# Patient Record
Sex: FEMALE | Race: WHITE | Employment: FULL TIME | URBAN - METROPOLITAN AREA
[De-identification: names, ages, dates, MRNs, and addresses within clinical notes are randomized per-mention and may not be internally consistent; named-entity substitution may affect disease eponyms.]

---

## 2017-05-16 ENCOUNTER — EMPLOYEE WELLNESS (OUTPATIENT)
Dept: OTHER | Age: 52
End: 2017-05-16

## 2017-05-16 LAB
CHOLESTEROL, TOTAL: 200 MG/DL (ref 0–199)
GLUCOSE BLD-MCNC: 82 MG/DL (ref 70–99)
HDLC SERPL-MCNC: 57 MG/DL (ref 40–60)
LDL CHOLESTEROL CALCULATED: 105 MG/DL
TRIGL SERPL-MCNC: 188 MG/DL (ref 0–150)

## 2018-02-20 ENCOUNTER — HOSPITAL ENCOUNTER (OUTPATIENT)
Dept: MAMMOGRAPHY | Age: 53
Discharge: HOME | End: 2018-02-20
Attending: FAMILY MEDICINE | Admitting: FAMILY MEDICINE

## 2018-02-20 DIAGNOSIS — Z12.31 VISIT FOR SCREENING MAMMOGRAM: ICD-10-CM

## 2018-03-20 VITALS — WEIGHT: 213 LBS | BODY MASS INDEX: 34.12 KG/M2

## 2018-12-11 ENCOUNTER — OFFICE VISIT (OUTPATIENT)
Dept: FAMILY MEDICINE CLINIC | Age: 53
End: 2018-12-11
Payer: COMMERCIAL

## 2018-12-11 VITALS
OXYGEN SATURATION: 97 % | RESPIRATION RATE: 16 BRPM | DIASTOLIC BLOOD PRESSURE: 88 MMHG | BODY MASS INDEX: 33.73 KG/M2 | WEIGHT: 214.9 LBS | SYSTOLIC BLOOD PRESSURE: 118 MMHG | TEMPERATURE: 98.4 F | HEART RATE: 76 BPM | HEIGHT: 67 IN

## 2018-12-11 DIAGNOSIS — Z12.11 SCREEN FOR COLON CANCER: ICD-10-CM

## 2018-12-11 DIAGNOSIS — Z00.00 WELL ADULT EXAM: Primary | ICD-10-CM

## 2018-12-11 DIAGNOSIS — Z23 NEED FOR TETANUS BOOSTER: ICD-10-CM

## 2018-12-11 DIAGNOSIS — I10 BENIGN ESSENTIAL HTN: ICD-10-CM

## 2018-12-11 LAB
A/G RATIO: 1.6 (ref 1.1–2.2)
ALBUMIN SERPL-MCNC: 4.8 G/DL (ref 3.4–5)
ALP BLD-CCNC: 98 U/L (ref 40–129)
ALT SERPL-CCNC: 21 U/L (ref 10–40)
ANION GAP SERPL CALCULATED.3IONS-SCNC: 16 MMOL/L (ref 3–16)
AST SERPL-CCNC: 22 U/L (ref 15–37)
BASOPHILS ABSOLUTE: 0 K/UL (ref 0–0.2)
BASOPHILS RELATIVE PERCENT: 1.1 %
BILIRUB SERPL-MCNC: 0.5 MG/DL (ref 0–1)
BUN BLDV-MCNC: 6 MG/DL (ref 7–20)
CALCIUM SERPL-MCNC: 9.5 MG/DL (ref 8.3–10.6)
CHLORIDE BLD-SCNC: 101 MMOL/L (ref 99–110)
CHOLESTEROL, TOTAL: 253 MG/DL (ref 0–199)
CO2: 23 MMOL/L (ref 21–32)
CREAT SERPL-MCNC: 0.7 MG/DL (ref 0.6–1.1)
CREATININE URINE: 40.5 MG/DL (ref 28–259)
EOSINOPHILS ABSOLUTE: 0.2 K/UL (ref 0–0.6)
EOSINOPHILS RELATIVE PERCENT: 3.9 %
GFR AFRICAN AMERICAN: >60
GFR NON-AFRICAN AMERICAN: >60
GLOBULIN: 3 G/DL
GLUCOSE BLD-MCNC: 119 MG/DL (ref 70–99)
HCT VFR BLD CALC: 38.1 % (ref 36–48)
HDLC SERPL-MCNC: 68 MG/DL (ref 40–60)
HEMOGLOBIN: 12.7 G/DL (ref 12–16)
HEPATITIS C ANTIBODY INTERPRETATION: NORMAL
LDL CHOLESTEROL CALCULATED: 157 MG/DL
LYMPHOCYTES ABSOLUTE: 1.1 K/UL (ref 1–5.1)
LYMPHOCYTES RELATIVE PERCENT: 24.9 %
MCH RBC QN AUTO: 31 PG (ref 26–34)
MCHC RBC AUTO-ENTMCNC: 33.4 G/DL (ref 31–36)
MCV RBC AUTO: 92.8 FL (ref 80–100)
MICROALBUMIN UR-MCNC: <1.2 MG/DL
MICROALBUMIN/CREAT UR-RTO: NORMAL MG/G (ref 0–30)
MONOCYTES ABSOLUTE: 0.4 K/UL (ref 0–1.3)
MONOCYTES RELATIVE PERCENT: 9.2 %
NEUTROPHILS ABSOLUTE: 2.7 K/UL (ref 1.7–7.7)
NEUTROPHILS RELATIVE PERCENT: 60.9 %
PDW BLD-RTO: 12.7 % (ref 12.4–15.4)
PLATELET # BLD: 254 K/UL (ref 135–450)
PMV BLD AUTO: 7.8 FL (ref 5–10.5)
POTASSIUM SERPL-SCNC: 4.3 MMOL/L (ref 3.5–5.1)
RBC # BLD: 4.1 M/UL (ref 4–5.2)
SODIUM BLD-SCNC: 140 MMOL/L (ref 136–145)
TOTAL PROTEIN: 7.8 G/DL (ref 6.4–8.2)
TRIGL SERPL-MCNC: 140 MG/DL (ref 0–150)
TSH SERPL DL<=0.05 MIU/L-ACNC: 2.08 UIU/ML (ref 0.27–4.2)
VLDLC SERPL CALC-MCNC: 28 MG/DL
WBC # BLD: 4.5 K/UL (ref 4–11)

## 2018-12-11 PROCEDURE — 90471 IMMUNIZATION ADMIN: CPT | Performed by: FAMILY MEDICINE

## 2018-12-11 PROCEDURE — 99386 PREV VISIT NEW AGE 40-64: CPT | Performed by: FAMILY MEDICINE

## 2018-12-11 PROCEDURE — 90715 TDAP VACCINE 7 YRS/> IM: CPT | Performed by: FAMILY MEDICINE

## 2018-12-11 RX ORDER — LISINOPRIL 20 MG/1
20 TABLET ORAL DAILY
Qty: 90 TABLET | Refills: 1 | Status: SHIPPED | OUTPATIENT
Start: 2018-12-11 | End: 2018-12-27 | Stop reason: SDUPTHER

## 2018-12-11 RX ORDER — METOPROLOL SUCCINATE 50 MG/1
50 TABLET, EXTENDED RELEASE ORAL DAILY
Qty: 90 TABLET | Refills: 1 | Status: SHIPPED | OUTPATIENT
Start: 2018-12-11 | End: 2018-12-27 | Stop reason: SDUPTHER

## 2018-12-11 ASSESSMENT — ENCOUNTER SYMPTOMS
EYE ITCHING: 0
WHEEZING: 0
RHINORRHEA: 0
VOMITING: 0
ABDOMINAL PAIN: 0
SHORTNESS OF BREATH: 0
CHEST TIGHTNESS: 0
TROUBLE SWALLOWING: 0
EYE REDNESS: 0
NAUSEA: 0

## 2018-12-11 ASSESSMENT — PATIENT HEALTH QUESTIONNAIRE - PHQ9
SUM OF ALL RESPONSES TO PHQ9 QUESTIONS 1 & 2: 0
2. FEELING DOWN, DEPRESSED OR HOPELESS: 0
SUM OF ALL RESPONSES TO PHQ QUESTIONS 1-9: 0
1. LITTLE INTEREST OR PLEASURE IN DOING THINGS: 0
SUM OF ALL RESPONSES TO PHQ QUESTIONS 1-9: 0

## 2018-12-11 NOTE — PROGRESS NOTES
Subjective:      Patient ID: Danielle Bonds is a 48 y.o. female. Here to become established       Well Adult Physical   Patient here for a comprehensive physical exam.The patient reports problems -   Hypertension    bp at home  Well controlled    , denies  ha, visual changes, syncope, presyncope, cp, sob, palpitations, edema, schulte, orthopnea, pnd,  Compliant with medication, no secondary effects       Do you take any herbs or supplements that were not prescribed by a doctor? no Are you taking calcium supplements? no Are you taking aspirin daily? no   History:  Last pap 5 years ago, \"normal\"  Mammogram utd  Colonoscopy due  Tdap due   Flu vaccine utd  Eye exam: 2 years ago   Dentist: every 6 months         Review of Systems   Constitutional: Negative for activity change, appetite change, fatigue, fever and unexpected weight change. HENT: Negative for congestion, postnasal drip, rhinorrhea and trouble swallowing. Eyes: Negative for redness and itching. Respiratory: Negative for chest tightness, shortness of breath and wheezing. Cardiovascular: Negative for chest pain and palpitations. Gastrointestinal: Negative for abdominal pain, nausea and vomiting. Endocrine: Negative for cold intolerance, heat intolerance, polydipsia and polyphagia. Genitourinary: Negative for difficulty urinating, dyspareunia and urgency. Musculoskeletal: Negative for arthralgias, joint swelling, myalgias and neck pain. Skin: Negative for rash. Neurological: Negative for light-headedness and headaches. Hematological: Negative for adenopathy. Psychiatric/Behavioral: The patient is not nervous/anxious. is allergic to hydrochlorothiazide.       Current Outpatient Prescriptions:     lisinopril (PRINIVIL;ZESTRIL) 20 MG tablet, Take 1 tablet by mouth daily, Disp: 90 tablet, Rfl: 1    metoprolol succinate (TOPROL XL) 50 MG extended release tablet, Take 1 tablet by mouth daily, Disp: 90 tablet, Rfl: 1    metoprolol (LOPRESSOR) 25 MG tablet, Take 50 mg by mouth daily. , Disp: , Rfl:     omeprazole (PRILOSEC) 20 MG capsule, Take 20 mg by mouth daily. , Disp: , Rfl:      has a past medical history of GERD (gastroesophageal reflux disease); Hypertension; Leg swelling; Obesity (BMI 30-39.9); Varicose veins of left lower extremity with inflammation; and Varicose veins of leg with pain. Past Surgical History:   Procedure Laterality Date    EYE SURGERY      lasix    EYE SURGERY      [facility]   [address]    open        reports that she quit smoking about 18 years ago. Her smoking use included Cigarettes. She has a 20.00 pack-year smoking history. She has never used smokeless tobacco. She reports that she drinks about 2.4 oz of alcohol per week . She reports that she does not use drugs. family history includes Cancer in her maternal grandfather; Heart Failure in her father; High Blood Pressure in her father; No Known Problems in her mother. Objective:  Blood pressure 118/88, pulse 76, temperature 98.4 °F (36.9 °C), temperature source Tympanic, resp. rate 16, height 5' 7\" (1.702 m), weight 214 lb 14.4 oz (97.5 kg), SpO2 97 %, not currently breastfeeding. Physical Exam   Constitutional: She is oriented to person, place, and time. She appears well-developed and well-nourished. No distress. HENT:   Head: Normocephalic and atraumatic. Right Ear: External ear normal.   Left Ear: External ear normal.   Nose: Nose normal.   Mouth/Throat: Oropharynx is clear and moist. No oropharyngeal exudate. Eyes: Pupils are equal, round, and reactive to light. Conjunctivae and EOM are normal. Right eye exhibits no discharge. Left eye exhibits no discharge. No scleral icterus. Neck: Normal range of motion. Neck supple. No JVD present. No tracheal deviation present. No thyromegaly present. No carotid bruits   Cardiovascular: Normal rate, regular rhythm, normal heart sounds and intact distal pulses.   Exam reveals no gallop and no friction rub. No murmur heard. Pulses:       Carotid pulses are 2+ on the right side, and 2+ on the left side. No edema     Pulmonary/Chest: Effort normal and breath sounds normal. No respiratory distress. She has no wheezes. She has no rales. She exhibits no tenderness. Abdominal: Soft. Bowel sounds are normal. She exhibits no distension and no mass. There is no tenderness. There is no rebound and no guarding. Musculoskeletal: Normal range of motion. She exhibits no edema or tenderness. Lymphadenopathy:     She has no cervical adenopathy. Neurological: She is alert and oriented to person, place, and time. She has normal reflexes. No cranial nerve deficit. She exhibits normal muscle tone. Coordination normal.   Skin: Skin is warm. Capillary refill takes less than 2 seconds. No rash noted. She is not diaphoretic. No erythema. No pallor. Psychiatric: She has a normal mood and affect. Her behavior is normal. Judgment and thought content normal.   Nursing note and vitals reviewed. Assessment:       Diagnosis Orders   1. Well adult exam  Comprehensive Metabolic Panel    Lipid Panel    HEMOGLOBIN A1C    TSH without Reflex    Microalbumin / Creatinine Urine Ratio    CBC Auto Differential    Hepatitis C Antibody    HIV Screen   2. Screen for colon cancer  Ambulatory referral to Gastroenterology   3. Benign essential HTN  Microalbumin / Creatinine Urine Ratio    lisinopril (PRINIVIL;ZESTRIL) 20 MG tablet    metoprolol succinate (TOPROL XL) 50 MG extended release tablet           Plan:      Well adult:    . Doing well, encourage healthy diet, exercise, safety    . Screening labs orders given   . Preventive: tdap   . Colonoscopy referred    . Mammogram: utd   . Tobacco abuse former smoker    3.  At goal  Refills  Continue same medications no changes needed at this time   Encourage compliance with medication, life style changes    Fu 6 months and anytime for pap  patient agrees and verbalizes understanding      Abdi Holguin received counseling on the following healthy behaviors: nutrition, exercise and medication adherence    Patient given educational materials on Nutrition, Exercise and Hypertension    I have instructed Abdi Holguin to complete a self tracking handout on Blood Pressures  and Weights and instructed them to bring it with them to her next appointment. Discussed use, benefit, and side effects of prescribed medications. Barriers to medication compliance addressed. All patient questions answered. Pt voiced understanding.                  Mayra Meza MD

## 2018-12-12 LAB
ESTIMATED AVERAGE GLUCOSE: 119.8 MG/DL
HBA1C MFR BLD: 5.8 %
HIV AG/AB: NORMAL
HIV ANTIGEN: NORMAL
HIV-1 ANTIBODY: NORMAL
HIV-2 AB: NORMAL

## 2018-12-27 ENCOUNTER — PATIENT MESSAGE (OUTPATIENT)
Dept: FAMILY MEDICINE CLINIC | Age: 53
End: 2018-12-27

## 2018-12-27 DIAGNOSIS — I10 BENIGN ESSENTIAL HTN: ICD-10-CM

## 2018-12-28 RX ORDER — LISINOPRIL 20 MG/1
20 TABLET ORAL DAILY
Qty: 90 TABLET | Refills: 1 | Status: SHIPPED | OUTPATIENT
Start: 2018-12-28 | End: 2019-05-15 | Stop reason: SDUPTHER

## 2018-12-28 RX ORDER — METOPROLOL SUCCINATE 50 MG/1
50 TABLET, EXTENDED RELEASE ORAL DAILY
Qty: 90 TABLET | Refills: 1 | Status: SHIPPED | OUTPATIENT
Start: 2018-12-28 | End: 2019-01-02 | Stop reason: SDUPTHER

## 2019-01-02 ENCOUNTER — PATIENT MESSAGE (OUTPATIENT)
Dept: FAMILY MEDICINE CLINIC | Age: 54
End: 2019-01-02

## 2019-01-02 DIAGNOSIS — I10 BENIGN ESSENTIAL HTN: ICD-10-CM

## 2019-01-02 RX ORDER — METOPROLOL SUCCINATE 50 MG/1
100 TABLET, EXTENDED RELEASE ORAL DAILY
Qty: 180 TABLET | Refills: 1 | Status: SHIPPED | OUTPATIENT
Start: 2019-01-02 | End: 2019-01-16 | Stop reason: SDUPTHER

## 2019-01-02 RX ORDER — AMLODIPINE BESYLATE 5 MG/1
5 TABLET ORAL DAILY
Qty: 90 TABLET | Refills: 3 | Status: SHIPPED | OUTPATIENT
Start: 2019-01-02 | End: 2019-05-15 | Stop reason: SDUPTHER

## 2019-01-16 RX ORDER — METOPROLOL SUCCINATE 50 MG/1
100 TABLET, EXTENDED RELEASE ORAL DAILY
Qty: 180 TABLET | Refills: 0 | Status: SHIPPED | OUTPATIENT
Start: 2019-01-16 | End: 2019-05-15 | Stop reason: SDUPTHER

## 2019-03-05 ENCOUNTER — TELEPHONE (OUTPATIENT)
Dept: FAMILY MEDICINE CLINIC | Age: 54
End: 2019-03-05

## 2019-05-15 DIAGNOSIS — I10 BENIGN ESSENTIAL HTN: ICD-10-CM

## 2019-05-15 RX ORDER — METOPROLOL SUCCINATE 50 MG/1
100 TABLET, EXTENDED RELEASE ORAL DAILY
Qty: 180 TABLET | Refills: 0 | Status: SHIPPED | OUTPATIENT
Start: 2019-05-15 | End: 2019-08-08 | Stop reason: SDUPTHER

## 2019-05-15 RX ORDER — LISINOPRIL 20 MG/1
20 TABLET ORAL DAILY
Qty: 90 TABLET | Refills: 1 | Status: SHIPPED | OUTPATIENT
Start: 2019-05-15 | End: 2019-10-25 | Stop reason: SDUPTHER

## 2019-05-15 RX ORDER — AMLODIPINE BESYLATE 5 MG/1
5 TABLET ORAL DAILY
Qty: 90 TABLET | Refills: 3 | Status: SHIPPED | OUTPATIENT
Start: 2019-05-15 | End: 2019-10-25 | Stop reason: ALTCHOICE

## 2019-06-17 ENCOUNTER — HOSPITAL ENCOUNTER (OUTPATIENT)
Dept: MAMMOGRAPHY | Age: 54
Discharge: HOME | End: 2019-06-22
Payer: COMMERCIAL

## 2019-06-17 DIAGNOSIS — Z12.31 VISIT FOR SCREENING MAMMOGRAM: ICD-10-CM

## 2019-06-17 PROCEDURE — 77063 BREAST TOMOSYNTHESIS BI: CPT

## 2019-09-06 ENCOUNTER — TELEPHONE (OUTPATIENT)
Dept: FAMILY MEDICINE CLINIC | Age: 54
End: 2019-09-06

## 2019-09-06 ENCOUNTER — PATIENT MESSAGE (OUTPATIENT)
Dept: FAMILY MEDICINE CLINIC | Age: 54
End: 2019-09-06

## 2019-09-06 DIAGNOSIS — I10 BENIGN ESSENTIAL HTN: ICD-10-CM

## 2019-09-06 RX ORDER — METOPROLOL SUCCINATE 50 MG/1
TABLET, EXTENDED RELEASE ORAL
Qty: 30 TABLET | Refills: 0 | Status: SHIPPED | OUTPATIENT
Start: 2019-09-06 | End: 2019-09-11 | Stop reason: SDUPTHER

## 2019-10-25 ENCOUNTER — OFFICE VISIT (OUTPATIENT)
Dept: FAMILY MEDICINE CLINIC | Age: 54
End: 2019-10-25
Payer: COMMERCIAL

## 2019-10-25 VITALS
OXYGEN SATURATION: 97 % | DIASTOLIC BLOOD PRESSURE: 104 MMHG | WEIGHT: 213 LBS | HEIGHT: 67 IN | SYSTOLIC BLOOD PRESSURE: 158 MMHG | HEART RATE: 79 BPM | TEMPERATURE: 97.7 F | BODY MASS INDEX: 33.43 KG/M2 | RESPIRATION RATE: 16 BRPM

## 2019-10-25 DIAGNOSIS — Z01.419 WELL FEMALE EXAM WITH ROUTINE GYNECOLOGICAL EXAM: Primary | ICD-10-CM

## 2019-10-25 DIAGNOSIS — N92.6 ABNORMAL MENSES: ICD-10-CM

## 2019-10-25 DIAGNOSIS — I10 BENIGN ESSENTIAL HTN: ICD-10-CM

## 2019-10-25 PROCEDURE — 36415 COLL VENOUS BLD VENIPUNCTURE: CPT | Performed by: FAMILY MEDICINE

## 2019-10-25 PROCEDURE — 99396 PREV VISIT EST AGE 40-64: CPT | Performed by: FAMILY MEDICINE

## 2019-10-25 RX ORDER — AMLODIPINE BESYLATE 10 MG/1
10 TABLET ORAL DAILY
Qty: 90 TABLET | Refills: 1 | Status: SHIPPED | OUTPATIENT
Start: 2019-10-25 | End: 2020-01-20 | Stop reason: SDUPTHER

## 2019-10-25 RX ORDER — LISINOPRIL 20 MG/1
20 TABLET ORAL DAILY
Qty: 90 TABLET | Refills: 1 | Status: SHIPPED | OUTPATIENT
Start: 2019-10-25 | End: 2020-01-20 | Stop reason: SDUPTHER

## 2019-10-25 RX ORDER — METOPROLOL SUCCINATE 50 MG/1
TABLET, EXTENDED RELEASE ORAL
Qty: 60 TABLET | Refills: 0 | Status: CANCELLED | OUTPATIENT
Start: 2019-10-25

## 2019-10-25 RX ORDER — METOPROLOL SUCCINATE 100 MG/1
100 TABLET, EXTENDED RELEASE ORAL DAILY
Qty: 30 TABLET | Refills: 3 | Status: SHIPPED | OUTPATIENT
Start: 2019-10-25 | End: 2019-11-12 | Stop reason: SDUPTHER

## 2019-10-25 RX ORDER — AMLODIPINE BESYLATE 5 MG/1
5 TABLET ORAL DAILY
Qty: 90 TABLET | Refills: 3 | Status: CANCELLED | OUTPATIENT
Start: 2019-10-25

## 2019-10-25 ASSESSMENT — ENCOUNTER SYMPTOMS
WHEEZING: 0
EYE REDNESS: 0
TROUBLE SWALLOWING: 0
ABDOMINAL PAIN: 0
EYE ITCHING: 0
SHORTNESS OF BREATH: 0
VOMITING: 0
NAUSEA: 0
CHEST TIGHTNESS: 0
RHINORRHEA: 0

## 2019-10-25 ASSESSMENT — PATIENT HEALTH QUESTIONNAIRE - PHQ9
2. FEELING DOWN, DEPRESSED OR HOPELESS: 0
SUM OF ALL RESPONSES TO PHQ QUESTIONS 1-9: 0
1. LITTLE INTEREST OR PLEASURE IN DOING THINGS: 0
SUM OF ALL RESPONSES TO PHQ9 QUESTIONS 1 & 2: 0
SUM OF ALL RESPONSES TO PHQ QUESTIONS 1-9: 0

## 2019-10-29 LAB
HPV COMMENT: NORMAL
HPV TYPE 16: NOT DETECTED
HPV TYPE 18: NOT DETECTED
HPVOH (OTHER TYPES): NOT DETECTED

## 2019-10-30 ENCOUNTER — TELEPHONE (OUTPATIENT)
Dept: FAMILY MEDICINE CLINIC | Age: 54
End: 2019-10-30

## 2019-11-07 ENCOUNTER — PATIENT MESSAGE (OUTPATIENT)
Dept: FAMILY MEDICINE CLINIC | Age: 54
End: 2019-11-07

## 2019-11-07 DIAGNOSIS — I10 BENIGN ESSENTIAL HTN: ICD-10-CM

## 2019-11-12 RX ORDER — METOPROLOL SUCCINATE 100 MG/1
100 TABLET, EXTENDED RELEASE ORAL DAILY
Qty: 90 TABLET | Refills: 1 | Status: SHIPPED | OUTPATIENT
Start: 2019-11-12 | End: 2020-01-20 | Stop reason: SDUPTHER

## 2019-11-15 ENCOUNTER — TELEPHONE (OUTPATIENT)
Dept: FAMILY MEDICINE CLINIC | Age: 54
End: 2019-11-15

## 2019-11-15 RX ORDER — METOPROLOL SUCCINATE 50 MG/1
50 TABLET, EXTENDED RELEASE ORAL DAILY
Qty: 90 TABLET | Refills: 5 | Status: SHIPPED | OUTPATIENT
Start: 2019-11-15 | End: 2020-01-20 | Stop reason: CLARIF

## 2020-01-20 ENCOUNTER — OFFICE VISIT (OUTPATIENT)
Dept: FAMILY MEDICINE CLINIC | Age: 55
End: 2020-01-20
Payer: COMMERCIAL

## 2020-01-20 VITALS
RESPIRATION RATE: 16 BRPM | HEIGHT: 67 IN | HEART RATE: 88 BPM | BODY MASS INDEX: 34.12 KG/M2 | TEMPERATURE: 97.2 F | OXYGEN SATURATION: 99 % | SYSTOLIC BLOOD PRESSURE: 150 MMHG | WEIGHT: 217.4 LBS | DIASTOLIC BLOOD PRESSURE: 94 MMHG

## 2020-01-20 LAB
ANION GAP SERPL CALCULATED.3IONS-SCNC: 19 MMOL/L (ref 3–16)
BASOPHILS ABSOLUTE: 0.1 K/UL (ref 0–0.2)
BASOPHILS RELATIVE PERCENT: 1.2 %
BUN BLDV-MCNC: 7 MG/DL (ref 7–20)
CALCIUM SERPL-MCNC: 10.3 MG/DL (ref 8.3–10.6)
CHLORIDE BLD-SCNC: 95 MMOL/L (ref 99–110)
CHOLESTEROL, TOTAL: 270 MG/DL (ref 0–199)
CO2: 19 MMOL/L (ref 21–32)
CREAT SERPL-MCNC: 0.6 MG/DL (ref 0.6–1.1)
EOSINOPHILS ABSOLUTE: 0.2 K/UL (ref 0–0.6)
EOSINOPHILS RELATIVE PERCENT: 3.1 %
ESTRADIOL LEVEL: 8 PG/ML
FOLLICLE STIMULATING HORMONE: 84.3 MIU/ML
GFR AFRICAN AMERICAN: >60
GFR NON-AFRICAN AMERICAN: >60
GLUCOSE BLD-MCNC: 89 MG/DL (ref 70–99)
HCT VFR BLD CALC: 38.8 % (ref 36–48)
HDLC SERPL-MCNC: 102 MG/DL (ref 40–60)
HEMOGLOBIN: 13.3 G/DL (ref 12–16)
LDL CHOLESTEROL CALCULATED: 154 MG/DL
LYMPHOCYTES ABSOLUTE: 1.7 K/UL (ref 1–5.1)
LYMPHOCYTES RELATIVE PERCENT: 29.5 %
MCH RBC QN AUTO: 32.8 PG (ref 26–34)
MCHC RBC AUTO-ENTMCNC: 34.3 G/DL (ref 31–36)
MCV RBC AUTO: 95.5 FL (ref 80–100)
MONOCYTES ABSOLUTE: 0.5 K/UL (ref 0–1.3)
MONOCYTES RELATIVE PERCENT: 8.1 %
NEUTROPHILS ABSOLUTE: 3.4 K/UL (ref 1.7–7.7)
NEUTROPHILS RELATIVE PERCENT: 58.1 %
PDW BLD-RTO: 13.3 % (ref 12.4–15.4)
PLATELET # BLD: 266 K/UL (ref 135–450)
PMV BLD AUTO: 7.5 FL (ref 5–10.5)
POTASSIUM SERPL-SCNC: 4.4 MMOL/L (ref 3.5–5.1)
RBC # BLD: 4.07 M/UL (ref 4–5.2)
SODIUM BLD-SCNC: 133 MMOL/L (ref 136–145)
TRIGL SERPL-MCNC: 70 MG/DL (ref 0–150)
TSH SERPL DL<=0.05 MIU/L-ACNC: 1.06 UIU/ML (ref 0.27–4.2)
VLDLC SERPL CALC-MCNC: 14 MG/DL
WBC # BLD: 5.8 K/UL (ref 4–11)

## 2020-01-20 PROCEDURE — 99214 OFFICE O/P EST MOD 30 MIN: CPT | Performed by: FAMILY MEDICINE

## 2020-01-20 RX ORDER — METOPROLOL SUCCINATE 100 MG/1
100 TABLET, EXTENDED RELEASE ORAL DAILY
Qty: 90 TABLET | Refills: 1 | Status: SHIPPED | OUTPATIENT
Start: 2020-01-20 | End: 2020-04-30 | Stop reason: SDUPTHER

## 2020-01-20 RX ORDER — SPIRONOLACTONE 25 MG/1
25 TABLET ORAL DAILY
Qty: 30 TABLET | Refills: 3 | Status: CANCELLED | OUTPATIENT
Start: 2020-01-20

## 2020-01-20 RX ORDER — AMLODIPINE BESYLATE 10 MG/1
10 TABLET ORAL DAILY
Qty: 90 TABLET | Refills: 1 | Status: SHIPPED | OUTPATIENT
Start: 2020-01-20 | End: 2020-04-30 | Stop reason: SDUPTHER

## 2020-01-20 RX ORDER — LISINOPRIL 20 MG/1
20 TABLET ORAL DAILY
Qty: 90 TABLET | Refills: 1 | Status: SHIPPED | OUTPATIENT
Start: 2020-01-20 | End: 2020-04-30 | Stop reason: SDUPTHER

## 2020-01-20 ASSESSMENT — PATIENT HEALTH QUESTIONNAIRE - PHQ9
1. LITTLE INTEREST OR PLEASURE IN DOING THINGS: 0
SUM OF ALL RESPONSES TO PHQ9 QUESTIONS 1 & 2: 0
SUM OF ALL RESPONSES TO PHQ QUESTIONS 1-9: 0
SUM OF ALL RESPONSES TO PHQ QUESTIONS 1-9: 0
2. FEELING DOWN, DEPRESSED OR HOPELESS: 0

## 2020-01-20 ASSESSMENT — ENCOUNTER SYMPTOMS
CHEST TIGHTNESS: 0
ORTHOPNEA: 0
SHORTNESS OF BREATH: 0
BLURRED VISION: 0

## 2020-01-20 NOTE — PROGRESS NOTES
Subjective:      Patient ID: Maria R Villarreal is a 54 y.o. female. Hypertension   This is a chronic problem. The current episode started more than 1 year ago. The problem is unchanged. The problem is controlled (at home \"is 120/80's, it gets high when I come to the doctor\"). Associated symptoms include anxiety (\"white coat syndrome\") and peripheral edema (occasional in afternoon, after sitting at work). Pertinent negatives include no blurred vision, chest pain, headaches, malaise/fatigue, neck pain, orthopnea, palpitations, PND, shortness of breath or sweats. There are no associated agents to hypertension. Past treatments include ACE inhibitors, beta blockers and calcium channel blockers. The current treatment provides significant improvement. There is no history of kidney disease, CAD/MI or left ventricular hypertrophy. There is no history of sleep apnea or a thyroid problem. Review of Systems   Constitutional: Negative for activity change, appetite change, diaphoresis, fatigue, malaise/fatigue and unexpected weight change. Eyes: Negative for blurred vision and visual disturbance. Respiratory: Negative for chest tightness and shortness of breath. Cardiovascular: Positive for leg swelling. Negative for chest pain, palpitations, orthopnea and PND. Musculoskeletal: Negative for neck pain. Neurological: Negative for headaches. Psychiatric/Behavioral: Negative for behavioral problems and sleep disturbance. The patient is nervous/anxious. is allergic to hydrochlorothiazide.       Current Outpatient Medications:     amLODIPine (NORVASC) 10 MG tablet, Take 1 tablet by mouth daily, Disp: 90 tablet, Rfl: 1    lisinopril (PRINIVIL;ZESTRIL) 20 MG tablet, Take 1 tablet by mouth daily, Disp: 90 tablet, Rfl: 1    metoprolol succinate (TOPROL XL) 100 MG extended release tablet, Take 1 tablet by mouth daily, Disp: 90 tablet, Rfl: 1    triamcinolone (KENALOG) 0.1 % ointment, Apply twice a day to affected area, Disp: 80 g, Rfl: 0    omeprazole (PRILOSEC) 20 MG capsule, Take 20 mg by mouth daily. , Disp: , Rfl:      has a past medical history of GERD (gastroesophageal reflux disease), Hypertension, Leg swelling, Obesity (BMI 30-39.9), Varicose veins of left lower extremity with inflammation, and Varicose veins of leg with pain. Past Surgical History:   Procedure Laterality Date    EYE SURGERY      lasix    EYE SURGERY      [facility]   [address]    open        reports that she quit smoking about 20 years ago. Her smoking use included cigarettes. She has a 20.00 pack-year smoking history. She has never used smokeless tobacco. She reports current alcohol use of about 4.0 standard drinks of alcohol per week. She reports that she does not use drugs. family history includes Cancer in her maternal grandfather; Heart Failure in her father; High Blood Pressure in her father; No Known Problems in her mother. Objective:  Blood pressure (!) 150/94, pulse 88, temperature 97.2 °F (36.2 °C), temperature source Tympanic, resp. rate 16, height 5' 7\" (1.702 m), weight 217 lb 6.4 oz (98.6 kg), SpO2 99 %, not currently breastfeeding. Physical Exam  Vitals signs and nursing note reviewed. Constitutional:       General: She is not in acute distress. Appearance: She is well-developed. She is obese. She is not ill-appearing, toxic-appearing or diaphoretic. HENT:      Head: Normocephalic. Eyes:      General: No scleral icterus. Conjunctiva/sclera: Conjunctivae normal.      Pupils: Pupils are equal, round, and reactive to light. Neck:      Musculoskeletal: Normal range of motion and neck supple. Thyroid: No thyromegaly. Vascular: No carotid bruit or JVD. Comments: No carotid bruits  Cardiovascular:      Rate and Rhythm: Normal rate and regular rhythm. Pulses:           Carotid pulses are 2+ on the right side and 2+ on the left side. Heart sounds: Normal heart sounds.  No murmur. No friction rub. No gallop. Comments: No edema    Pulmonary:      Effort: Pulmonary effort is normal. No respiratory distress. Breath sounds: Normal breath sounds. No stridor. No wheezing, rhonchi or rales. Chest:      Chest wall: No tenderness. Abdominal:      General: Abdomen is flat. Bowel sounds are normal.      Palpations: Abdomen is soft. There is no mass. Tenderness: There is no tenderness. Musculoskeletal:      Right lower leg: No edema. Left lower leg: No edema. Lymphadenopathy:      Cervical: No cervical adenopathy. Skin:     General: Skin is warm. Capillary Refill: Capillary refill takes less than 2 seconds. Coloration: Skin is not pale. Neurological:      General: No focal deficit present. Mental Status: She is alert and oriented to person, place, and time. Mental status is at baseline. Cranial Nerves: No cranial nerve deficit. Motor: No weakness or abnormal muscle tone. Gait: Gait normal.      Deep Tendon Reflexes: Reflexes are normal and symmetric. Psychiatric:         Mood and Affect: Mood normal.         Behavior: Behavior normal.         Thought Content: Thought content normal.         Assessment:       Diagnosis Orders   1. Uncontrolled hypertension     2. Benign essential HTN  amLODIPine (NORVASC) 10 MG tablet    lisinopril (PRINIVIL;ZESTRIL) 20 MG tablet    metoprolol succinate (TOPROL XL) 100 MG extended release tablet           Plan:      1. Sec to white coat syndrome  Her bp readings at home are at goal  Continue same medications no changes needed at this time   Encourage compliance with medication, life style changes    2.  Get labs  Continue same medications no changes needed at this time     Fu 6 mo    Kye Banegas received counseling on the following healthy behaviors: nutrition, exercise and medication adherence    Patient given educational materials on Nutrition, Exercise and Hypertension    I have instructed Kye Banegas to complete a self tracking handout on Blood Pressures  and Weights and instructed them to bring it with them to her next appointment. Discussed use, benefit, and side effects of prescribed medications. Barriers to medication compliance addressed. All patient questions answered. Pt voiced understanding.            Sherel Councilman, MD

## 2020-01-21 LAB
ESTIMATED AVERAGE GLUCOSE: 105.4 MG/DL
HBA1C MFR BLD: 5.3 %

## 2020-02-07 ENCOUNTER — OFFICE VISIT (OUTPATIENT)
Dept: FAMILY MEDICINE CLINIC | Age: 55
End: 2020-02-07
Payer: COMMERCIAL

## 2020-02-07 VITALS
OXYGEN SATURATION: 98 % | SYSTOLIC BLOOD PRESSURE: 140 MMHG | HEIGHT: 67 IN | HEART RATE: 80 BPM | DIASTOLIC BLOOD PRESSURE: 92 MMHG | WEIGHT: 217 LBS | BODY MASS INDEX: 34.06 KG/M2 | TEMPERATURE: 97.3 F | RESPIRATION RATE: 16 BRPM

## 2020-02-07 PROCEDURE — 99214 OFFICE O/P EST MOD 30 MIN: CPT | Performed by: FAMILY MEDICINE

## 2020-02-07 RX ORDER — SIMVASTATIN 10 MG
10 TABLET ORAL NIGHTLY
Qty: 30 TABLET | Refills: 0 | Status: SHIPPED | OUTPATIENT
Start: 2020-02-07 | End: 2020-03-12 | Stop reason: SDUPTHER

## 2020-02-07 RX ORDER — HYDROXYZINE HYDROCHLORIDE 25 MG/1
25 TABLET, FILM COATED ORAL EVERY 8 HOURS PRN
Qty: 30 TABLET | Refills: 1 | Status: SHIPPED | OUTPATIENT
Start: 2020-02-07 | End: 2020-02-17

## 2020-02-07 ASSESSMENT — ENCOUNTER SYMPTOMS
SHORTNESS OF BREATH: 0
CHEST TIGHTNESS: 0
WHEEZING: 0
COLOR CHANGE: 0

## 2020-02-07 ASSESSMENT — PATIENT HEALTH QUESTIONNAIRE - PHQ9
1. LITTLE INTEREST OR PLEASURE IN DOING THINGS: 0
SUM OF ALL RESPONSES TO PHQ QUESTIONS 1-9: 0
2. FEELING DOWN, DEPRESSED OR HOPELESS: 0
SUM OF ALL RESPONSES TO PHQ9 QUESTIONS 1 & 2: 0
SUM OF ALL RESPONSES TO PHQ QUESTIONS 1-9: 0

## 2020-02-07 NOTE — PROGRESS NOTES
Subjective:      Patient ID: Gilford Mote is a 54 y.o. female. Hyperlipidemia   This is a new problem. This is a new diagnosis. Recent lipid tests were reviewed and are high. Exacerbating diseases include obesity. Factors aggravating her hyperlipidemia include fatty foods and beta blockers. Pertinent negatives include no chest pain, focal sensory loss, focal weakness, leg pain, myalgias or shortness of breath. She is currently on no antihyperlipidemic treatment. Compliance problems include adherence to diet and adherence to exercise. Risk factors for coronary artery disease include hypertension, obesity, stress, a sedentary lifestyle and post-menopausal.     Anxiety  Patient is here for evaluation of anxiety. She has the following anxiety symptoms: insomnia, irritable. Onset of symptoms was approximately several months ago. Symptoms have been gradually worsening since that time. She denies current suicidal and homicidal ideation. Family history significant for no psychiatric illness. Possible organic causes contributing are: none. Risk factors: none Previous treatment includes none. She complains of the following medication side effects: na  .    Review of Systems   Constitutional: Positive for fatigue. Negative for activity change, appetite change and unexpected weight change. Respiratory: Negative for chest tightness, shortness of breath and wheezing. Cardiovascular: Negative for chest pain, palpitations and leg swelling. Musculoskeletal: Negative for myalgias. Skin: Negative for color change. Neurological: Negative for focal weakness. Psychiatric/Behavioral: Positive for dysphoric mood and sleep disturbance. Negative for behavioral problems, self-injury and suicidal ideas. The patient is nervous/anxious. is allergic to hydrochlorothiazide.       Current Outpatient Medications:     simvastatin (ZOCOR) 10 MG tablet, Take 1 tablet by mouth nightly, Disp: 30 tablet, Rfl: 0    hydrOXYzine (ATARAX) 25 MG tablet, Take 1 tablet by mouth every 8 hours as needed for Anxiety, Disp: 30 tablet, Rfl: 1    amLODIPine (NORVASC) 10 MG tablet, Take 1 tablet by mouth daily, Disp: 90 tablet, Rfl: 1    lisinopril (PRINIVIL;ZESTRIL) 20 MG tablet, Take 1 tablet by mouth daily, Disp: 90 tablet, Rfl: 1    metoprolol succinate (TOPROL XL) 100 MG extended release tablet, Take 1 tablet by mouth daily, Disp: 90 tablet, Rfl: 1    triamcinolone (KENALOG) 0.1 % ointment, Apply twice a day to affected area, Disp: 80 g, Rfl: 0    omeprazole (PRILOSEC) 20 MG capsule, Take 20 mg by mouth daily. , Disp: , Rfl:      has a past medical history of GERD (gastroesophageal reflux disease), Hypertension, Leg swelling, Obesity (BMI 30-39.9), Varicose veins of left lower extremity with inflammation, and Varicose veins of leg with pain. Past Surgical History:   Procedure Laterality Date    EYE SURGERY      lasix    EYE SURGERY      [facility] eye   [address] [place] [address]        reports that she quit smoking about 20 years ago. Her smoking use included cigarettes. She has a 20.00 pack-year smoking history. She has never used smokeless tobacco. She reports current alcohol use of about 4.0 standard drinks of alcohol per week. She reports that she does not use drugs. family history includes Cancer in her maternal grandfather; Heart Failure in her father; High Blood Pressure in her father; No Known Problems in her mother. Objective:  Blood pressure (!) 140/92, pulse 80, temperature 97.3 °F (36.3 °C), temperature source Tympanic, resp. rate 16, height 5' 7\" (1.702 m), weight 217 lb (98.4 kg), SpO2 98 %, not currently breastfeeding. Physical Exam  Vitals signs and nursing note reviewed. Constitutional:       General: She is not in acute distress. Appearance: Normal appearance. She is well-developed. She is obese. HENT:      Head: Normocephalic. Eyes:      General: No scleral icterus.      Conjunctiva/sclera: Conjunctivae normal.      Pupils: Pupils are equal, round, and reactive to light. Neck:      Musculoskeletal: Normal range of motion. Vascular: No carotid bruit. Cardiovascular:      Rate and Rhythm: Normal rate and regular rhythm. Heart sounds: No murmur. Pulmonary:      Effort: Pulmonary effort is normal. No respiratory distress. Breath sounds: Normal breath sounds. No wheezing. Musculoskeletal: Normal range of motion. Right lower leg: No edema. Left lower leg: No edema. Skin:     General: Skin is warm. Capillary Refill: Capillary refill takes less than 2 seconds. Findings: No erythema. Neurological:      General: No focal deficit present. Mental Status: She is alert and oriented to person, place, and time. Mental status is at baseline. Cranial Nerves: No cranial nerve deficit. Sensory: No sensory deficit. Coordination: Coordination normal.   Psychiatric:         Mood and Affect: Mood normal.         Behavior: Behavior normal.         Assessment:       Diagnosis Orders   1. Hyperlipidemia, unspecified hyperlipidemia type     2. Anxious mood             Plan:      1. After discussion of the treatment of hyperlipidemia, a statin-drug is chosen; prescription for Zocor (simvastatin) once daily is written. The patient is informed that this type of drug is usually highly effective to lower LDL cholesterol and is usually very well tolerated. However, statin-type drugs can potentially injure the liver. Blood tests will be required at regular intervals for the duration of therapy. Damage to the liver, if detected early, can be reversed by stopping the drug. Be alert for persistent nausea, abdominal pain, or yellow jaundice, and report such to me directly. Statin drugs may also cause skeletal muscle injury in rare cases. Be alert for pronounced persistent diffuse muscle pain and discontinue the drug immediately should such symptoms develop.  Grapefruit juice may increase the blood levels and side effects of some HMG Co-A reductase inhibitors (Zocor, Lipitor and Mevacor but not Pravachol or Lescol). Patient is counseled in this regard. 2. Trial with atarax prn   patient has been instructed caution with driving or handling of heavy machinery while taking  this medication as it  can cause drowsiness,  patient agrees and verbalizes understanding     Fu 3 mo     Lino Yadav received counseling on the following healthy behaviors: nutrition, exercise and medication adherence    Patient given educational materials on Hyperlipidemia    I have instructed Lino Yadav to complete a self tracking handout on Blood Pressures  and Weights and instructed them to bring it with them to her next appointment. Discussed use, benefit, and side effects of prescribed medications. Barriers to medication compliance addressed. All patient questions answered. Pt voiced understanding.              Jf Leal MD

## 2020-03-12 ENCOUNTER — PATIENT MESSAGE (OUTPATIENT)
Dept: FAMILY MEDICINE CLINIC | Age: 55
End: 2020-03-12

## 2020-03-12 RX ORDER — SIMVASTATIN 10 MG
10 TABLET ORAL NIGHTLY
Qty: 30 TABLET | Refills: 0 | Status: SHIPPED | OUTPATIENT
Start: 2020-03-12 | End: 2020-03-17 | Stop reason: SDUPTHER

## 2020-03-12 NOTE — TELEPHONE ENCOUNTER
From: Cali Sheehan  To: Chayo Gutierrez MD  Sent: 3/12/2020 4:06 PM EDT  Subject: Prescription Question    I would like to request a refill of the Cholesterol medicine. I need it to be filled for 90 days not 30.  Thanks

## 2020-03-31 RX ORDER — SIMVASTATIN 10 MG
TABLET ORAL
Qty: 90 TABLET | Refills: 1 | Status: SHIPPED | OUTPATIENT
Start: 2020-03-31 | End: 2021-01-27 | Stop reason: ALTCHOICE

## 2020-04-30 RX ORDER — METOPROLOL SUCCINATE 100 MG/1
100 TABLET, EXTENDED RELEASE ORAL DAILY
Qty: 90 TABLET | Refills: 1 | Status: SHIPPED | OUTPATIENT
Start: 2020-04-30 | End: 2020-11-13 | Stop reason: SDUPTHER

## 2020-04-30 RX ORDER — LISINOPRIL 20 MG/1
20 TABLET ORAL DAILY
Qty: 90 TABLET | Refills: 1 | Status: SHIPPED | OUTPATIENT
Start: 2020-04-30 | End: 2020-05-27 | Stop reason: ALTCHOICE

## 2020-04-30 RX ORDER — AMLODIPINE BESYLATE 10 MG/1
10 TABLET ORAL DAILY
Qty: 90 TABLET | Refills: 1 | Status: SHIPPED | OUTPATIENT
Start: 2020-04-30 | End: 2020-05-27 | Stop reason: ALTCHOICE

## 2020-05-27 ENCOUNTER — TELEMEDICINE (OUTPATIENT)
Dept: FAMILY MEDICINE CLINIC | Age: 55
End: 2020-05-27
Payer: COMMERCIAL

## 2020-05-27 VITALS — BODY MASS INDEX: 33.99 KG/M2 | WEIGHT: 217 LBS

## 2020-05-27 PROCEDURE — 99214 OFFICE O/P EST MOD 30 MIN: CPT | Performed by: FAMILY MEDICINE

## 2020-05-27 RX ORDER — LOSARTAN POTASSIUM AND HYDROCHLOROTHIAZIDE 25; 100 MG/1; MG/1
1 TABLET ORAL DAILY
Qty: 90 TABLET | Refills: 0 | Status: SHIPPED | OUTPATIENT
Start: 2020-05-27 | End: 2020-11-13 | Stop reason: SDUPTHER

## 2020-05-27 ASSESSMENT — ENCOUNTER SYMPTOMS
ABDOMINAL PAIN: 0
CHEST TIGHTNESS: 0
ORTHOPNEA: 0
SHORTNESS OF BREATH: 0
BLURRED VISION: 0
ABDOMINAL DISTENTION: 0

## 2020-08-11 ENCOUNTER — OFFICE VISIT (OUTPATIENT)
Dept: ORTHOPEDIC SURGERY | Age: 55
End: 2020-08-11
Payer: COMMERCIAL

## 2020-08-11 VITALS — HEIGHT: 67 IN | WEIGHT: 217 LBS | BODY MASS INDEX: 34.06 KG/M2

## 2020-08-11 PROCEDURE — 99243 OFF/OP CNSLTJ NEW/EST LOW 30: CPT | Performed by: ORTHOPAEDIC SURGERY

## 2020-08-11 RX ORDER — BUPIVACAINE HYDROCHLORIDE 5 MG/ML
30 INJECTION, SOLUTION PERINEURAL ONCE
Status: COMPLETED | OUTPATIENT
Start: 2020-08-11 | End: 2020-08-11

## 2020-08-11 RX ORDER — BETAMETHASONE SODIUM PHOSPHATE AND BETAMETHASONE ACETATE 3; 3 MG/ML; MG/ML
12 INJECTION, SUSPENSION INTRA-ARTICULAR; INTRALESIONAL; INTRAMUSCULAR; SOFT TISSUE ONCE
Status: COMPLETED | OUTPATIENT
Start: 2020-08-11 | End: 2020-08-11

## 2020-08-11 RX ORDER — TRAMADOL HYDROCHLORIDE 50 MG/1
50 TABLET ORAL EVERY 6 HOURS PRN
Qty: 28 TABLET | Refills: 0 | Status: CANCELLED | OUTPATIENT
Start: 2020-08-11 | End: 2020-08-18

## 2020-08-11 RX ADMIN — BUPIVACAINE HYDROCHLORIDE 150 MG: 5 INJECTION, SOLUTION PERINEURAL at 13:46

## 2020-08-11 RX ADMIN — BETAMETHASONE SODIUM PHOSPHATE AND BETAMETHASONE ACETATE 12 MG: 3; 3 INJECTION, SUSPENSION INTRA-ARTICULAR; INTRALESIONAL; INTRAMUSCULAR; SOFT TISSUE at 13:45

## 2020-08-11 NOTE — PROGRESS NOTES
Chief Complaint    Knee Pain (LEFT KNEE PAIN. NO INJURY PAIN FOR YEAR ON & OFF. MEDIAL SIDED)      History of Present Illness:  Greta Hardy is a 54 y.o. female who comes in today for evaluation and treatment of left knee pain. She is referred in today for orthopedic consultation the request of her PCP, Sally Garcia. The patient has had a year of on and off pain over the medial aspect of her left knee with no specific injury. Is been causing her to walk with an antalgic gait. She has been icing and using oral analgesics with no improvement. She reports occasional episodes of instability and giving way. Pain Assessment  Location of Pain: Knee  Location Modifiers: Left  Severity of Pain: 5  Quality of Pain: Sharp, Aching, Dull  Duration of Pain: Persistent  Frequency of Pain: Intermittent  Aggravating Factors: Stairs, Walking, Bending, Stretching  Limiting Behavior: Some  Relieving Factors: Rest  Result of Injury: No  Work-Related Injury: No  Are there other pain locations you wish to document?: No]       Medical History:  Past Medical History:   Diagnosis Date    GERD (gastroesophageal reflux disease)     Hypertension     Leg swelling     bilateral legs    Obesity (BMI 30-39. 9)     Varicose veins of left lower extremity with inflammation     bilateral legs    Varicose veins of leg with pain     bilateral legs     Patient Active Problem List    Diagnosis Date Noted    Leg swelling     Varicose veins of left lower extremity with inflammation     Varicose veins of leg with pain     Hypertension      Current Outpatient Medications   Medication Sig Dispense Refill    furosemide (LASIX) 20 MG tablet Take 1 tablet by mouth daily 10 tablet 0    losartan-hydrochlorothiazide (HYZAAR) 100-25 MG per tablet Take 1 tablet by mouth daily 90 tablet 0    metoprolol succinate (TOPROL XL) 100 MG extended release tablet Take 1 tablet by mouth daily 90 tablet 1    simvastatin (ZOCOR) 10 MG tablet TAKE ONE TABLET BY joint.    Findings: Successful needle placement for knee injection. Final images were taken and saved for permanent record. The patient tolerated the injection well. The patient was instructed to call the office immediately if there is any pain, redness, warmth, fever, or chills. Treatment Plan:  I discussed with the patient the nature of osteoarthritis of the knee. We talked about treatment of arthritis and the various options that are involved with this. The patient understands that the treatments can vary from essentially doing nothing to a total joint replacement arthroplasty for arthritis. I then went on to describe the utilization of glucosamine and chondroitin sulfate as a joint nutrition product. We talked about the fact that this is essentially a joint vitamin with typically minimal side effects. We also talked about utilization of prescription over-the-counter anti-inflammatory medications as the next option. We also discussed the possibility of brace wear or orthotic wear if the patient has significant varus alignment. We then went on to discuss the possibility of Visco supplementation with hyaluronate products. We talked about the typical course of this type of treatment and the fact that often times in the treatment for significant arthritis, this is successful less than half the time. We also talked about the corticosteroid injections and the fact that this can give a brief window of relief, but does not cure the problem; in fact, the pain often has a rebound effect in 6-10 weeks after the steroid has worn off. We also discussed arthroscopy surgery in attempts to debride the joint, but the fact that this is relatively unreliable treatment in the face of significant arthritis. It can occasionally be used, particularly if there is significant meniscus pathology. Lastly we discussed total joint replacement arthroplasty as the final and definitive step in treatment of arthritis.  Patient realizes the magnitude of this type of treatment as well as having voiced a general understanding to the duration of the prosthesis. The patient voiced understanding to these continuum of treatment options.

## 2020-11-13 ENCOUNTER — TELEPHONE (OUTPATIENT)
Dept: FAMILY MEDICINE CLINIC | Age: 55
End: 2020-11-13

## 2020-11-13 RX ORDER — METOPROLOL SUCCINATE 100 MG/1
100 TABLET, EXTENDED RELEASE ORAL DAILY
Qty: 90 TABLET | Refills: 0 | Status: SHIPPED | OUTPATIENT
Start: 2020-11-13 | End: 2021-02-22

## 2020-11-13 RX ORDER — LOSARTAN POTASSIUM AND HYDROCHLOROTHIAZIDE 25; 100 MG/1; MG/1
1 TABLET ORAL DAILY
Qty: 90 TABLET | Refills: 0 | Status: SHIPPED | OUTPATIENT
Start: 2020-11-13 | End: 2020-11-23

## 2020-11-20 ENCOUNTER — PATIENT MESSAGE (OUTPATIENT)
Dept: FAMILY MEDICINE CLINIC | Age: 55
End: 2020-11-20

## 2020-11-23 RX ORDER — LISINOPRIL 20 MG/1
20 TABLET ORAL DAILY
Qty: 90 TABLET | Refills: 1 | Status: SHIPPED | OUTPATIENT
Start: 2020-11-23 | End: 2020-11-30 | Stop reason: SDUPTHER

## 2020-11-23 NOTE — TELEPHONE ENCOUNTER
From: Roxy Gonzalez  To: Zaira Griffin MD  Sent: 11/20/2020 8:32 PM EST  Subject: Prescription Question    Hello,    When Dr. Sin Saez ordered my BP meds he ordered Losartan/HCTZ 100-25MG instead of Lisinopril 20 MG Tabs. I am allergic to losartan-hydroCHLOROthiazide 100-25 MG per tablet. I was in the hospital years ago because my system was deleted of Potassium and Magnesium when taking that. Can I Please get Lisinopril 20 MG/90 day supply please. I just spent $22.90 on it and I can't take it. Please let me know when you can call this in to [facility]. Thank you.

## 2020-11-25 ENCOUNTER — PATIENT MESSAGE (OUTPATIENT)
Dept: FAMILY MEDICINE CLINIC | Age: 55
End: 2020-11-25

## 2020-11-30 RX ORDER — LISINOPRIL 20 MG/1
20 TABLET ORAL DAILY
Qty: 90 TABLET | Refills: 1 | Status: SHIPPED | OUTPATIENT
Start: 2020-11-30 | End: 2021-03-22 | Stop reason: SINTOL

## 2020-11-30 NOTE — TELEPHONE ENCOUNTER
From: Marco Carbajal  To: Stephon Liao MD  Sent: 11/25/2020 4:38 PM EST  Subject: Prescription Question    I sent a previous message but don't know where it went. I was prescribed the wrong medication. I went to  my prescription and it was for Losartan and not Lisinopril. I was told I am allergic to Losartan when I was in the hospital for my Potassium and Magnesium being depleted in my system. Can I please have a prescription for Lisinopril 20 MG (90 day supply) sent to [facility] in [facility]. I already wasted $22.90 on Losartan and I don't know that I can even get that back. Thanks.

## 2020-12-28 ENCOUNTER — HOSPITAL ENCOUNTER (OUTPATIENT)
Dept: MAMMOGRAPHY | Age: 55
Discharge: HOME | End: 2021-01-02
Payer: COMMERCIAL

## 2020-12-28 DIAGNOSIS — Z12.31 VISIT FOR SCREENING MAMMOGRAM: ICD-10-CM

## 2020-12-28 PROCEDURE — 77067 SCR MAMMO BI INCL CAD: CPT

## 2021-01-06 DIAGNOSIS — S83.242A TEAR OF MEDIAL MENISCUS OF LEFT KNEE, UNSPECIFIED TEAR TYPE, UNSPECIFIED WHETHER OLD OR CURRENT TEAR, INITIAL ENCOUNTER: Primary | ICD-10-CM

## 2021-01-06 DIAGNOSIS — M17.12 PRIMARY OSTEOARTHRITIS OF LEFT KNEE: ICD-10-CM

## 2021-01-15 ENCOUNTER — HOSPITAL ENCOUNTER (OUTPATIENT)
Dept: MRI IMAGING | Age: 56
Discharge: HOME | End: 2021-01-15
Payer: COMMERCIAL

## 2021-01-15 DIAGNOSIS — S83.242A TEAR OF MEDIAL MENISCUS OF LEFT KNEE, UNSPECIFIED TEAR TYPE, UNSPECIFIED WHETHER OLD OR CURRENT TEAR, INITIAL ENCOUNTER: ICD-10-CM

## 2021-01-15 DIAGNOSIS — M17.12 PRIMARY OSTEOARTHRITIS OF LEFT KNEE: ICD-10-CM

## 2021-01-15 PROCEDURE — 73721 MRI JNT OF LWR EXTRE W/O DYE: CPT

## 2021-01-21 ENCOUNTER — TELEPHONE (OUTPATIENT)
Dept: ORTHOPEDIC SURGERY | Age: 56
End: 2021-01-21

## 2021-01-21 NOTE — TELEPHONE ENCOUNTER
Spoke to the patient today regarding her MRI results. This does show some tearing of the posterior horn of the medial meniscus but also grade 4 changes with full cartilage loss of the medial compartment. Radiographically her x-rays also demonstrate bone-on-bone changes of the medial compartment. We had a long discussion regarding treatment options including oral analgesics, repeat injections, viscosupplementation, weight loss, bracing, etc.  We also discussed arthroscopic surgery versus joint replacement. She understands that arthroscopic surgery would likely provide limited benefit due to the degree of advanced osteoarthritic changes. She is interested in viscosupplementation. She is within the [facility] system and will need to retain a [address] to continue care.   I have given her several names within the [facility] group that she could follow-up with to discuss the neck step in treatment including viscosupplementation

## 2021-01-25 ENCOUNTER — OFFICE VISIT (OUTPATIENT)
Dept: ORTHOPEDIC SURGERY | Age: 56
End: 2021-01-25
Payer: COMMERCIAL

## 2021-01-25 VITALS — HEIGHT: 67 IN | BODY MASS INDEX: 33.43 KG/M2 | WEIGHT: 213 LBS | TEMPERATURE: 96.7 F

## 2021-01-25 DIAGNOSIS — M17.12 PRIMARY OSTEOARTHRITIS OF LEFT KNEE: Primary | ICD-10-CM

## 2021-01-25 DIAGNOSIS — M25.561 BILATERAL CHRONIC KNEE PAIN: ICD-10-CM

## 2021-01-25 DIAGNOSIS — M25.562 BILATERAL CHRONIC KNEE PAIN: ICD-10-CM

## 2021-01-25 DIAGNOSIS — G89.29 BILATERAL CHRONIC KNEE PAIN: ICD-10-CM

## 2021-01-25 DIAGNOSIS — M17.11 PRIMARY OSTEOARTHRITIS OF RIGHT KNEE: ICD-10-CM

## 2021-01-25 PROCEDURE — 99213 OFFICE O/P EST LOW 20 MIN: CPT | Performed by: ORTHOPAEDIC SURGERY

## 2021-01-25 RX ORDER — IBUPROFEN 200 MG
200 TABLET ORAL EVERY 6 HOURS PRN
Status: ON HOLD | COMMUNITY
End: 2021-04-01 | Stop reason: HOSPADM

## 2021-01-25 NOTE — PROGRESS NOTES
Patient Antony Sanz  Medical Record Number: [id number]  YOB: 1965  Date of Encounter: 1/25/2021     Chief Complaint   Patient presents with    Knee Pain     Chronic Left knee pain x1 yr. Last cortisone injection, 8/11/20, did not help. History of Present Illness:  Grace Real is a 64 y.o. female here for evaluation of her chronic bilateral knee pain. Her pain assessment is documented below and I reviewed this with her today. Patient's chief complaint is left knee pain however states she is also having worsening right knee pain. She denies any recent injury. She was referred by Dr. Beck Barbour because patient is a [employer] employee and has to see a [employer] orthopedic physician. Patient has moderate to severe left knee osteoarthritis with mild to moderate right knee osteoarthritis. Patient states she has tried over-the-counter and prescription medications to help control her pain. She has tried rest, ice, heat and activity modification without relief of her pain. She has even tried cortisone injections which provided little relief. Patient states her pain is starting to become constant and limiting her activities of daily living. Pain Assessment  Location of Pain: Knee  Location Modifiers: Left  Severity of Pain: 8  Quality of Pain: Aching, Other (Comment)(Stabbing)  Duration of Pain: Persistent  Frequency of Pain: Intermittent  Date Pain First Started: (x1 yr)  Aggravating Factors: Walking  Limiting Behavior: Yes  Relieving Factors: Other (Comment)(brace)  Result of Injury: No  Work-Related Injury: No  Are there other pain locations you wish to document?: No    Past Medical History:   Diagnosis Date    GERD (gastroesophageal reflux disease)     Hypertension     Leg swelling     bilateral legs    Obesity (BMI 30-39. 9)     Varicose veins of left lower extremity with inflammation     bilateral legs    Varicose veins of leg with pain     bilateral legs       Past Surgical History: Procedure Laterality Date    EYE SURGERY      lasix    EYE SURGERY      wondering eye    TUBAL LIGATION  1989    open       Current Outpatient Medications   Medication Sig Dispense Refill    ibuprofen (ADVIL;MOTRIN) 200 MG tablet Take 200 mg by mouth every 6 hours as needed for Pain      lisinopril (PRINIVIL;ZESTRIL) 20 MG tablet Take 1 tablet by mouth daily 90 tablet 1    metoprolol succinate (TOPROL XL) 100 MG extended release tablet Take 1 tablet by mouth daily 90 tablet 0    omeprazole (PRILOSEC) 20 MG capsule Take 20 mg by mouth daily. No current facility-administered medications for this visit. Allergies, social and family histories were reviewed and updated as appropriate. Review of Systems:  Relevant review of systems reviewed and available in the patient's chart under the 'MEDIA' tab. Vital Signs:  Temp 96.7 °F (35.9 °C) (Infrared)   Ht 5' 7\" (1.702 m)   Wt 213 lb (96.6 kg)   BMI 33.36 kg/m²     General Exam:   Constitutional: Patient is adequately groomed with no evidence of malnutrition  Mental Status: The patient is oriented to time, place and person. The patient's mood and affect are appropriate. Lymphatic: The lymphatic examination bilaterally reveals all areas to be without enlargement or induration. Neurological: The patient has good coordination and balance. There is no focal weakness or sensory deficit. Bilateral Knee Examination:    Inspection: Normal muscle contours and no significant limb length discrepancy. No gross atrophy in any particular myotome. There are very mild joint effusions bilaterally There are no abrasions, lacerations, contusions, hematomas or ecchymosis. There is no erythema, induration or warmth to suggest an infectious process. Palpation: Patient has tenderness on palpation over the medial joint lines bilaterally. She has severe patellofemoral crepitus on the left and moderate patellofemoral crepitus on the right.     Range of Motion: Flexion: 117°   Extension: 0°    Strength:  Quad 4+ / 5  ; Hamstrings 4+ / 5. Gross motor to hip and ankle intact    Special Tests:    Lachman (ACL) - negative   Posterior Lachman (PCL) - negative    Anterior Drawer (ACL) - negative   Posterior Drawer (PCL) - negative   Pivot shift (ACL) - negative    Posterior sag (PCL) - negative   Homans Test (Thrombophlebitis)- negative   Does not open to valgus or varus stress at 0 or 30° (MCL/LCL)    Skin: There are no rashes, ulcerations or lesions. Sensation to light touch intact. Circulation: The limb is warm and well perfused. Distal pulses intact. Capillary refill is intact. Edema: none. Venous stasis changes: none. Gait: Patient has a antalgic gait and is taking short strides. Radiology:    4 view left knee x-rays completed with Dr. Jr Melo on 8/11/2020 and reviewed in office today:  Impression: No evidence for acute fracture or subluxation / dislocation. There are no loose bodies appreciated. There is no evidence of tibiofemoral subluxation. There are moderate to severe arthritic changes of the left knee with bone-on-bone changes in the medial compartment with severe tricompartmental osteophyte formation. There are mild to moderate arthritic changes of the right knee with joint space narrowing and mild osteophyte formation. MRI left knee completed 1/15/2021 and reviewed in office today:    Impression   1. Tear of the posterior root attachment of the medial meniscus.  Likely some   intact fibers. 2. Moderate tricompartmental osteoarthritis of the knee with grade 4   chondromalacia of the medial patellofemoral compartments. 3. Moderate knee effusion. 4. Mild tendinosis of the intact extensor mechanism. Images were independently reviewed by me and are consistent with moderate arthritic changes in the medial patellofemoral compartments severe enough to warrant knee replacement is the only surgical option.     Impression:  Encounter Diagnoses   Name Primary?  Primary osteoarthritis of left knee Yes    Primary osteoarthritis of right knee     Bilateral chronic knee pain        Office Procedures:  Orders Placed This Encounter   Procedures    EUFLEXXA INJECTION (For Auth/Precert)     Standing Status:   Future     Standing Expiration Date:   1/25/2022       Treatment Plan:          Patient presented today with a chief complaint of worsening left knee pain but states she is also having worsening right knee pain. She has moderate to severe arthritic changes of the left knee with mild to moderate arthritic changes of the right knee. Her pain is starting to affect her activities of daily living such as cooking, cleaning,   grocery shopping, stairs and sometimes even getting up from a seated position. She has tried conservative treatment options for several years which are starting to become ineffective. She has even had cortisone injections which have started to provide very little pain relief. Patient has not yet ready to discuss total knee replacement. We discussed other conservative treatment options and she elected to proceed with visco supplementation. We will submit to insurance for approval.  Patient will continue modifying activities as to control pain. Patient understands she will likely require joint replacement in the future. Domo Cates was informed of the results of any imaging. We discussed treatment options and a time was given to answer questions. A plan was proposed and Domo Cates understand and accepts this course of care. During this examination, OLIVERIO Driver PA-C, functioned as a scribe for Dr. eJfe Pimentel. The history taking and physical examination were also performed by Dr. Jefe Pimentel. This encounter was performed by me personally with Carlene Driver PA-C serving solely as a scribe. The above note is an accurate reflection of that encounter and has been reviewed for content by me. Cintia Tracy, MD, [address] [place] [address] and Sports Medicine    Please note that portions of this note were completed with a voice recognition program.  Efforts were made to edit the dictations but occasionally words are mis-transcribed.

## 2021-01-25 NOTE — LETTER
[address]  Phone: [phone]  Fax: [phone]    Nisha Rocha MD        January 27, 2021     Heladio Fairchild [address] [place] [address]    Patient: Jni Brush  MR Number: [id number]  YOB: 1965  Date of Visit: 1/25/2021    Dear Dr. Verónica Esposito:    Thank you for the request for consultation for Jin Brush to me for the evaluation of bilateral knee arthritis. Below are the relevant portions of my assessment and plan of care. If you have questions, please do not hesitate to call me. I look forward to following Eugene Garcia along with you.     Sincerely,        Nisha Rocha MD

## 2021-01-27 ENCOUNTER — TELEPHONE (OUTPATIENT)
Dept: ORTHOPEDIC SURGERY | Age: 56
End: 2021-01-27

## 2021-01-27 NOTE — TELEPHONE ENCOUNTER
01/27/2021    VISCO-3  (SERIES OF 3)   BILATERAL KNEES. DRUG REQUEST IS AUTHORIZED ON THE PHARMACY BENEFIT FOR THIS MEMBER GROUP ([facility]). PATIENT INFORMATION AND VERBAL SCRIPT GIVEN TO DAVID AT [address] [place] [address] ON 1/27/2021 . THEY WILL CONTACT PATIENT FOR CONSENT AND COPAY THEN CALL TO SCHEDULE DELIVERY. DRUG WILL BE DELIVERED TO THE [place] OFFICE.  EMA

## 2021-01-28 NOTE — TELEPHONE ENCOUNTER
01/28/2021  DRUG SCHEDULED FOR DELIVERY TO THE [place] OFFICE ON TUESDAY, VIA FED-X BY 10:30AM, PER [facility].  AP

## 2021-02-05 ENCOUNTER — OFFICE VISIT (OUTPATIENT)
Dept: ORTHOPEDIC SURGERY | Age: 56
End: 2021-02-05
Payer: COMMERCIAL

## 2021-02-05 VITALS — WEIGHT: 213 LBS | HEIGHT: 67 IN | TEMPERATURE: 96.8 F | BODY MASS INDEX: 33.43 KG/M2

## 2021-02-05 DIAGNOSIS — M17.12 PRIMARY OSTEOARTHRITIS OF LEFT KNEE: Primary | ICD-10-CM

## 2021-02-05 DIAGNOSIS — M17.11 PRIMARY OSTEOARTHRITIS OF RIGHT KNEE: ICD-10-CM

## 2021-02-05 PROCEDURE — 20610 DRAIN/INJ JOINT/BURSA W/O US: CPT | Performed by: ORTHOPAEDIC SURGERY

## 2021-02-05 NOTE — PROGRESS NOTES
Paige Tracy MD  [facility]  [address] [facility] [address] [facility], [address] [facility]  [address] [facility], Liyah Posey 19    Subjective:  bilateral knee pain. She is here for  1st Visco 3 injection for the  bilateral knee(s). Objective: There is a mild joint effusion noted of the bilateral knee(s). There is mild pain with range of motion testing. There is no significant  instability noted. Neuro exam grossly intact both lower extremities. Intact sensation to light touch. Motor exam 4+ to 5/5 in all major motor groups. Negative Walters's sign. Skin is warm, dry and intact with out erythema or significant increased temperature around the knee joint(s). Assessment:  Degenerative Joint Disease of the bilateral Knee(s). Plan:  Discussed Visco 3 injection including all  risks and benefits including increased pain, drug reaction, infection, bleeding, lack of improvement and  neurovascular injury. All the questions were answered. PROCEDURE NOTE:  PRE-PROCEDURE DIAGNOSIS: DJD knee  POST-PROCEDURE DIAGNOSIS: DJD knee    With the patient's permission, her bilaterally knee was prepped in standard sterile fashion with betadine and alcohol. The prefilled 2mL injection of the preferred Visco 3 was injected into the lateral joint space compartment of both knees without difficulty. The patient tolerated the procedure(s) well without difficulty. A band-aid was applied. POST-PROCEDURE INSTRUCTIONS GIVEN TO PATIENT: The patient was advised to ice the knee for 15-20 minutes to relieve any injection site related pain. Decrease activity for the next 24 to 48 hours. May use prescription or OTC pain relievers as needed      FOLLOW-UP:   As directed or call or return to clinic if these symptoms worsen or fail to improve as anticipated. If at any time you are concerned you may contact the office for further instructions or care.      By signing my name below, Sukhwinder Benjamin [facility], attest that this documentation has been prepared under the direction and in the presence of Alba Lesches, MD.   Electronically Signed: Horace Topete, 2/5/21, 11:13 AM EST. Agapito Vidales MD, personally performed the services described in this documentation. All medical record entries made by the scribe were at my direction and in my presence. I have reviewed the chart and discharge instructions (if applicable) and agree that the record reflects my personal performance and is accurate and complete. Alba Lesches, MD, 2/5/21, 2:08 PM EST.

## 2021-02-15 ENCOUNTER — OFFICE VISIT (OUTPATIENT)
Dept: ORTHOPEDIC SURGERY | Age: 56
End: 2021-02-15
Payer: COMMERCIAL

## 2021-02-15 VITALS — TEMPERATURE: 96.8 F | HEIGHT: 67 IN | BODY MASS INDEX: 33.43 KG/M2 | WEIGHT: 213 LBS

## 2021-02-15 DIAGNOSIS — M17.11 PRIMARY OSTEOARTHRITIS OF RIGHT KNEE: ICD-10-CM

## 2021-02-15 DIAGNOSIS — M17.12 PRIMARY OSTEOARTHRITIS OF LEFT KNEE: Primary | ICD-10-CM

## 2021-02-15 PROCEDURE — 20610 DRAIN/INJ JOINT/BURSA W/O US: CPT | Performed by: PHYSICIAN ASSISTANT

## 2021-02-15 NOTE — PROGRESS NOTES
Patient Jayme Alvarez Record Number: [id number]  YOB: 1965  Date of Encounter: 2/15/2021     Chief Complaint   Patient presents with    Injections     Visco 3, #2, Bilateral knee. History of Present Illness:  William Rodriguez is a 64 y.o. female here for her 2nd Visco-3 injection in her knees bilaterally. Patient has had no adverse events from prior Visco-3 injections. Vital Signs:  Temp 96.8 °F (36 °C) (Infrared)   Ht 5' 7\" (1.702 m)   Wt 213 lb (96.6 kg)   BMI 33.36 kg/m²     Bilateral Knee Examination:  There are mild joint effusions noted bilaterally. There is mild pain with range of motion testing. There is no significant instability noted. Neuro exam of both lower extremities is grossly intact . Intact sensation to light touch. Motor exam 4+ to 5/5 in all major motor groups. Negative Walters's sign. Skin is warm, dry and intact with out erythema, induration or warmth. Impression:   Diagnosis Orders   1. Primary osteoarthritis of left knee  MO ARTHROCENTESIS ASPIR&/INJ MAJOR JT/BURSA W/O US    sodium hyaluronate (SUPARTZ) injection 25 mg   2. Primary osteoarthritis of right knee  MO ARTHROCENTESIS ASPIR&/INJ MAJOR JT/BURSA W/O US    sodium hyaluronate (SUPARTZ) injection 25 mg     Office Procedures:  Orders Placed This Encounter   Procedures    MO ARTHROCENTESIS ASPIR&/INJ MAJOR JT/BURSA W/O US      Injections:   Discussed Visco-3 injections including all risks and benefits including increased pain, drug reaction, infection, bleeding, lack of improvement and neurovascular injury. Questions were encouraged and answered. The correct patient, procedure, site and side were identified. With the patient's permission, both knees were prepped in standard sterile fashion with betadine and alcohol. The prefilled injection of Visco-3 was injected into the medial joint space compartment under aseptic technique without difficulty.  The skin was then cleaned again with alcohol and

## 2021-02-19 DIAGNOSIS — I10 BENIGN ESSENTIAL HTN: ICD-10-CM

## 2021-02-22 ENCOUNTER — OFFICE VISIT (OUTPATIENT)
Dept: ORTHOPEDIC SURGERY | Age: 56
End: 2021-02-22
Payer: COMMERCIAL

## 2021-02-22 VITALS — TEMPERATURE: 97.1 F | HEIGHT: 67 IN | WEIGHT: 213 LBS | BODY MASS INDEX: 33.43 KG/M2

## 2021-02-22 DIAGNOSIS — M17.11 PRIMARY OSTEOARTHRITIS OF RIGHT KNEE: ICD-10-CM

## 2021-02-22 DIAGNOSIS — M17.12 PRIMARY OSTEOARTHRITIS OF LEFT KNEE: Primary | ICD-10-CM

## 2021-02-22 PROCEDURE — 20610 DRAIN/INJ JOINT/BURSA W/O US: CPT | Performed by: ORTHOPAEDIC SURGERY

## 2021-02-22 RX ORDER — METOPROLOL SUCCINATE 100 MG/1
TABLET, EXTENDED RELEASE ORAL
Qty: 90 TABLET | Refills: 0 | Status: SHIPPED | OUTPATIENT
Start: 2021-02-22 | End: 2021-05-30 | Stop reason: SDUPTHER

## 2021-02-22 NOTE — PROGRESS NOTES
Ghazala Tracy MD  [address]. [facility], [address] [place], [place] [address], Liyah Posey 19    Subjective:  bilateral knee pain. She is here for  3rd Visco 3 injection for the  bilateral knee(s). No significant adverse events from her prior injections. Pain level averaging 1-2/10 in both knees. Objective:   Temperature 97.1 °F (36.2 °C), temperature source Infrared, height 5' 7\" (1.702 m), weight 213 lb (96.6 kg), not currently breastfeeding. There is a mild joint effusion noted of the bilateral knee(s). There is minimal pain with range of motion testing. There is no significant  instability noted. Neuro exam grossly intact both lower extremities. Intact sensation to light touch. Motor exam 4+ to 5/5 in all major motor groups. Negative Walters's sign. Skin is warm, dry and intact with out erythema or significant increased temperature around the knee joint(s). Assessment:  Degenerative Joint Disease of the bilateral Knee(s). Plan:  Discussed Visco 3 injection including all  risks and benefits including increased pain, drug reaction, infection, bleeding, lack of improvement and  neurovascular injury. All the questions were answered. PROCEDURE NOTE:  PRE-PROCEDURE DIAGNOSIS: DJD knee  POST-PROCEDURE DIAGNOSIS: DJD knee    With the patient's permission, her bilaterally knee was prepped in standard sterile fashion with betadine and alcohol. The prefilled 2mL injection of the preferred Visco 3 was injected into the bilaterally lateral joint space compartment without difficulty. The patient tolerated the procedure(s) well without difficulty. A band-aid was applied. POST-PROCEDURE INSTRUCTIONS GIVEN TO PATIENT: The patient was advised to ice the knee for 15-20 minutes to relieve any injection site related pain. Decrease activity for the next 24 to 48 hours.  May use prescription or OTC pain relievers as needed      FOLLOW-UP:   As directed or call or return to clinic if these symptoms worsen or fail to improve as anticipated. If at any time you are concerned you may contact the office for further instructions or care. By signing my name below, Monica Ceballos, attest that this documentation has been prepared under the direction and in the presence of Lilly Elias MD.   Electronically Signed: Nanda Crenshaw, 2/22/21, 1:43 PM EST. Claudean Distel, MD, personally performed the services described in this documentation. All medical record entries made by the nanda were at my direction and in my presence. I have reviewed the chart and discharge instructions (if applicable) and agree that the record reflects my personal performance and is accurate and complete. Lilly Elias MD, 3/21/21, 6:10 PM EDT.

## 2021-03-05 ENCOUNTER — PATIENT MESSAGE (OUTPATIENT)
Dept: ORTHOPEDIC SURGERY | Age: 56
End: 2021-03-05

## 2021-03-05 NOTE — LETTER
[facility]  Surgery Scheduling Form:  [facility]    DEMOGRAPHICS:                                                                                                              .    Patient Name:  Miracle Rodriguez  Patient :  1965   Patient SS#:      Patient Phone:  [phone] (home) [phone] (work) Alt. Patient Phone:    Patient Address:  [address] [place] [address]    PCP:  Aurelia Bynum MD    Payor/Plan Subscr  Sex Relation Sub. Ins. ID Effective Group Num   1. [facility] 1965 Female Self [id number]                                   [address]         DIAGNOSIS & PROCEDURE:                                                                                            .    Diagnosis:   Left medial meniscus tear S83.242A, Left tibial plateau fracture F80.546N  Operation:  Left knee arthroscopy partial medial meniscectomy, repair of tibial plateau fracture - 08585  & 97023  Location: [facility]  Provider:  Susie Longoria.  NAYELI [place]:                                                                                         .    Requested Date:  21    OR Time:  11:15                      Patient Arrival Time:  9:15  OR Time Required:  30  Minutes  Anesthesia:  General  Equipment:    Mini C-Arm:  No   Standard C-Arm:  No   Status:  Outpatient  PAT Required:  No  Comments:Allergies: Hydrochlorothiazide   BMI: 33.36     03/10/21   BILLING INFORMATION:                                                                                                    .    Procedure:       CPT Code Modifier  Left knee arthroscopy partial medial meniscectomy               ORTHOPAEDIC SURGERY PRE-SURGICAL PHYSICIAN ORDERS    Miracle Rodriguez  1965  Date of Surgery: 21  Left knee arthroscopy partial medial meniscectomy, repair of tibial plateau fracture    Hydrochlorothiazide  History & Physical: [ ] By Surgeon   By PCP Darlene ]  Referrals:  [ ] Medical/Cardiac Clearance by _____________________________  [ ] Joint Replacement Class  [ ] Physical Therapy  [ ] Crutch Walking Instructions   Weight Bearing Status _____________  [ ] Occupational Therapy  Galla.Smoker ] Smoking Cessation Instructions  [ ] Other ________________________________________________    Pre Admission Testing:  [ ] Hemoglobin & Hematocrit   [ ] Comprehensive Metabolic Panel  [ ] CBC with differential            [ ] Type & Screen  [ ] CBC without differential       [ ] Type & Crossmatch _____ units  [ ] PT/INR                                   [ ] Autologous Blood _____ units  [ ] PTT                                        [ ]  EKG  [ ] Urinalysis                               Galla.Smoker ]  Other Anesthesia guidelines  [ ] Urinalysis with C & S  [ ] Basic Metabolic Panel         [ ] MRSA-nasal    Orders to be initiated upon admission to same day surgery:  Galla.Smoker ] Fasting blood sugar by fingerstick [ ] Kali Lesch  Galla.Smoker ] PT/INR (pt takes Warafin/Coumadin)     [ ] Interscalene Right or Left  Galla.Smoker ] HCG _X___ Urine _____ Serum              [ ] Femoral Right or Left  [ ] Other ______________________________________________    IV Fluids and Medications:  1. Place IV catherer for IV access. The RN may use 0.1ml of 1% Lidocaine SQ      Per site for IV starts up to maximum of 0.5ml.  2. Infuse Lactated Ringers IV fluid at 50ml per hour. For diabetic or renal impaired patient, infuse 0.9% Normal Saline at 50ml/hr. 3. Cefazolin 2g IV <119.9kg (264lbs) OR 3g if >120kg ([id number]), given within one hour of incision time. For those allergic to Cephalosporins, give Clindamycin 900mg IV within one hour of incision time.      4. Other medications: _________________________________________    Additional Orders:  Galla.Smoker ] Knee high anti-embolism stockings and antithrombic compression pumps (apply in Pre-op)      Physican Signature:                                                           Date: 3/10/2021 Time: 7:28 PM

## 2021-03-06 NOTE — TELEPHONE ENCOUNTER
From: Kimberley Ledbetter  To: Conchita Phillips MD  Sent: 3/5/2021 6:34 PM EST  Subject: Visit Follow-Up Question    I am still having daily pain in my knee if I am on my feet for even a small amount of time. The pain is in the meniscus area. The shots do seem to have helped the arthritis pain, but I really think that at the least I need my meniscus in my left knee repaired. I want to be able to walk and exercise and play volleyball this summer. Can we schedule surgery on my knee please. Thanks.

## 2021-03-10 NOTE — TELEPHONE ENCOUNTER
Please schedule for left knee arthroscopy and PMM. She can choose location at either [place] or [facility].   thanks

## 2021-03-11 ENCOUNTER — TELEPHONE (OUTPATIENT)
Dept: ORTHOPEDIC SURGERY | Age: 56
End: 2021-03-11

## 2021-03-11 NOTE — TELEPHONE ENCOUNTER
Spoke to patient and let her know we will be calling her to schedule surgery. Also scheduled VV to discuss surgery details.

## 2021-03-11 NOTE — TELEPHONE ENCOUNTER
CPT: 96226, [id number]  BODY PART: left knee  AUTHORIZATION: NPR    Per [place] website, NPR

## 2021-03-15 ENCOUNTER — VIRTUAL VISIT (OUTPATIENT)
Dept: ORTHOPEDIC SURGERY | Age: 56
End: 2021-03-15
Payer: COMMERCIAL

## 2021-03-15 ENCOUNTER — PATIENT MESSAGE (OUTPATIENT)
Dept: FAMILY MEDICINE CLINIC | Age: 56
End: 2021-03-15

## 2021-03-15 DIAGNOSIS — S83.232D COMPLEX TEAR OF MEDIAL MENISCUS OF LEFT KNEE, UNSPECIFIED WHETHER OLD OR CURRENT TEAR, SUBSEQUENT ENCOUNTER: Primary | ICD-10-CM

## 2021-03-15 DIAGNOSIS — M17.12 PRIMARY OSTEOARTHRITIS OF LEFT KNEE: ICD-10-CM

## 2021-03-15 DIAGNOSIS — Z01.818 PRE-OP EXAM: Primary | ICD-10-CM

## 2021-03-15 PROCEDURE — 99213 OFFICE O/P EST LOW 20 MIN: CPT | Performed by: ORTHOPAEDIC SURGERY

## 2021-03-15 NOTE — PROGRESS NOTES
3/15/2021    TELEHEALTH EVALUATION -- Audio/Visual (During [id number] public health emergency)    HPI:    Jaime Nagel (:  1965) has requested an audio/video evaluation for the following concern(s):    She presents today to discuss the elective knee arthroscopy procedure. Review of Systems    Prior to Visit Medications    Medication Sig Taking? Authorizing Provider   metoprolol succinate (TOPROL XL) 100 MG extended release tablet Take 1 tablet by mouth once daily Yes Camilla Gallegos MD   omeprazole (PRILOSEC) 20 MG capsule Take 20 mg by mouth daily. Yes Historical Provider, MD   ibuprofen (ADVIL;MOTRIN) 800 MG tablet Take 1 tablet by mouth every 8 hours as needed for Pain  Sonny Liu MD   lisinopril (PRINIVIL;ZESTRIL) 10 MG tablet Take 10 mg by mouth daily  Historical Provider, MD       Social History     Tobacco Use    Smoking status: Former Smoker     Packs/day: 1.00     Years: 20.00     Pack years: 20.00     Types: Cigarettes     Quit date:      Years since quittin.    Smokeless tobacco: Never Used   Substance Use Topics    Alcohol use: Yes     Alcohol/week: 4.0 standard drinks     Types: 4 Glasses of wine per week     Comment: occ    Drug use: No        PHYSICAL EXAMINATION:  Constitutional: [x] Appears well-developed and well-nourished [x] No apparent distress      [] Abnormal-   Mental status  [x] Alert and awake  [x] Oriented to person/place/time [x]Able to follow commands      Eyes:  EOM    [x]  Normal  [] Abnormal-  Sclera  []  Normal  [] Abnormal -         Discharge []  None visible  [] Abnormal -    HENT:   [x] Normocephalic, atraumatic.   [] Abnormal   [] Mouth/Throat: Mucous membranes are moist.     Pulmonary/Chest: [x] Respiratory effort normal.  [x] No visualized signs of difficulty breathing or respiratory distress        [] Abnormal-            Psychiatric:       [x] Normal Affect [x] No Hallucinations        [] Abnormal-     MRI of her left knee from January 15, 2021:  1. Tear of the posterior root attachment of the medial meniscus.  Likely some   intact fibers. 2. Moderate tricompartmental osteoarthritis of the knee with grade 4   chondromalacia of the medial patellofemoral compartments. 3. Moderate knee effusion. 4. Mild tendinosis of the intact extensor mechanism.             ASSESSMENT/PLAN:  1. Complex tear of medial meniscus of left knee, unspecified whether old or current tear, subsequent encounter      2. Primary osteoarthritis of left knee    We discussed treatment options and her main goal is to resume participating in her recreational volleyball league. I explained to her that while there is significant degenerative changes starting in this knee there are some features that could be addressed arthroscopically including her meniscus tear as well as an area of subchondral insufficiency in the tibial plateau noted on the MRI. This is typically treated with injection of a bone substitute which does harden and provide more structural support to the overlying joint surface. Many patients find this helps with her pain eventually and they are allowed to continue with high demand activities such as running jumping and diving for balls like she would need to for volleyball. I explained to her that if she moved onto joint arthroplasty, the diving on the floor for balls and jumping could be highly detrimental to any implant and would not be recommended. I did share with her that I have another patient who is middle-aged and participates in high-level volleyball leagues and is undergone similar procedure with good success which is lasted for over 2 years. We reviewed the nature of arthroscopic surgery as well as its potential risks and benefits. She understands the risks of surgery include but are not limited to:  Infection, risk to neurovascular structures, persistent stiffness and pain, possible need for further surgery including continued arthritic pain that would require joint replacement, anesthetic misadventure and other medical surgical complications it could threaten her life or limb. At this point, she will work with my surgery scheduler to develop the best time for outpatient surgery. I explained to her that she will be on crutches for least the first few days postoperatively and that the bone injection is much more painful than standard arthroscopy and it would be at least 6 weeks after surgery before she can be ready to start running and jumping at best.      No follow-ups on file. Keshav Askewo, was evaluated through a synchronous (real-time) audio-video encounter. The patient (or guardian if applicable) is aware that this is a billable service. Verbal consent to proceed has been obtained within the past 12 months. The visit was conducted pursuant to the emergency declaration under the [facility], [address] [place] [address] authority and the [place] and [employer] General Act. Patient identification was verified, and a caregiver was present when appropriate. The patient was located in a state where the provider was credentialed to provide care.     Total time spent on this encounter: 12 minutes

## 2021-03-15 NOTE — TELEPHONE ENCOUNTER
From: Nicky Wang  To: James Norman MD  Sent: 3/15/2021 11:56 AM EDT  Subject: Non-Urgent Radha Aguirre,    Well this is kind of urgent. I am having surgery on my knee on April 1st and need to have a form filled out before then by you. There are no appointments before then when I look in My Chart. Can you help?      Thanks,

## 2021-03-16 DIAGNOSIS — Z01.810 PREOP CARDIOVASCULAR EXAM: Primary | ICD-10-CM

## 2021-03-16 NOTE — TELEPHONE ENCOUNTER
Db on Friday afternoon for cpe   BUT NEEDS TO GET EKG AT HOSPITAL FIRST      Order it Dionna Fang   Dx preop    Labs before sx:   Cbc w diff  Bmp    Just as reminder: my Chart are not for urgent msgs.

## 2021-03-19 ENCOUNTER — OFFICE VISIT (OUTPATIENT)
Dept: FAMILY MEDICINE CLINIC | Age: 56
End: 2021-03-19
Payer: COMMERCIAL

## 2021-03-19 ENCOUNTER — HOSPITAL ENCOUNTER (OUTPATIENT)
Age: 56
Discharge: HOME | End: 2021-03-19
Payer: COMMERCIAL

## 2021-03-19 VITALS
SYSTOLIC BLOOD PRESSURE: 140 MMHG | OXYGEN SATURATION: 98 % | HEIGHT: 67 IN | DIASTOLIC BLOOD PRESSURE: 82 MMHG | TEMPERATURE: 97.2 F | WEIGHT: 216 LBS | BODY MASS INDEX: 33.9 KG/M2 | RESPIRATION RATE: 14 BRPM | HEART RATE: 85 BPM

## 2021-03-19 DIAGNOSIS — M25.562 CHRONIC PAIN OF LEFT KNEE: ICD-10-CM

## 2021-03-19 DIAGNOSIS — Z01.818 PREOP EXAMINATION: Primary | ICD-10-CM

## 2021-03-19 DIAGNOSIS — I10 BENIGN ESSENTIAL HTN: ICD-10-CM

## 2021-03-19 DIAGNOSIS — G89.29 CHRONIC PAIN OF LEFT KNEE: ICD-10-CM

## 2021-03-19 DIAGNOSIS — Z01.818 PRE-OP EXAM: ICD-10-CM

## 2021-03-19 LAB
ANION GAP SERPL CALCULATED.3IONS-SCNC: 15 MMOL/L (ref 3–16)
BASOPHILS ABSOLUTE: 0.1 K/UL (ref 0–0.2)
BASOPHILS RELATIVE PERCENT: 1.2 %
BUN BLDV-MCNC: 5 MG/DL (ref 7–20)
CALCIUM SERPL-MCNC: 9.8 MG/DL (ref 8.3–10.6)
CHLORIDE BLD-SCNC: 91 MMOL/L (ref 99–110)
CO2: 23 MMOL/L (ref 21–32)
CREAT SERPL-MCNC: 0.6 MG/DL (ref 0.6–1.1)
EOSINOPHILS ABSOLUTE: 0.2 K/UL (ref 0–0.6)
EOSINOPHILS RELATIVE PERCENT: 3.1 %
GFR AFRICAN AMERICAN: >60
GFR NON-AFRICAN AMERICAN: >60
GLUCOSE BLD-MCNC: 113 MG/DL (ref 70–99)
HCT VFR BLD CALC: 37.4 % (ref 36–48)
HEMOGLOBIN: 12.8 G/DL (ref 12–16)
LYMPHOCYTES ABSOLUTE: 1 K/UL (ref 1–5.1)
LYMPHOCYTES RELATIVE PERCENT: 18.9 %
MCH RBC QN AUTO: 32.7 PG (ref 26–34)
MCHC RBC AUTO-ENTMCNC: 34.1 G/DL (ref 31–36)
MCV RBC AUTO: 95.8 FL (ref 80–100)
MONOCYTES ABSOLUTE: 0.5 K/UL (ref 0–1.3)
MONOCYTES RELATIVE PERCENT: 10.3 %
NEUTROPHILS ABSOLUTE: 3.4 K/UL (ref 1.7–7.7)
NEUTROPHILS RELATIVE PERCENT: 66.5 %
PDW BLD-RTO: 13.7 % (ref 12.4–15.4)
PLATELET # BLD: 293 K/UL (ref 135–450)
PMV BLD AUTO: 7.6 FL (ref 5–10.5)
POTASSIUM SERPL-SCNC: 4.5 MMOL/L (ref 3.5–5.1)
RBC # BLD: 3.91 M/UL (ref 4–5.2)
SODIUM BLD-SCNC: 129 MMOL/L (ref 136–145)
WBC # BLD: 5.1 K/UL (ref 4–11)

## 2021-03-19 PROCEDURE — 93005 ELECTROCARDIOGRAM TRACING: CPT | Performed by: FAMILY MEDICINE

## 2021-03-19 PROCEDURE — 99244 OFF/OP CNSLTJ NEW/EST MOD 40: CPT | Performed by: FAMILY MEDICINE

## 2021-03-19 ASSESSMENT — PATIENT HEALTH QUESTIONNAIRE - PHQ9
SUM OF ALL RESPONSES TO PHQ QUESTIONS 1-9: 0
2. FEELING DOWN, DEPRESSED OR HOPELESS: 0
SUM OF ALL RESPONSES TO PHQ QUESTIONS 1-9: 0
SUM OF ALL RESPONSES TO PHQ9 QUESTIONS 1 & 2: 0
SUM OF ALL RESPONSES TO PHQ QUESTIONS 1-9: 0

## 2021-03-19 NOTE — PROGRESS NOTES
Preoperative Consultation      Efrem Roldan  YOB: 1965    Date of Service:  3/19/2021    Vitals:    03/19/21 1439   BP: (!) 140/82   Pulse: 85   Resp: 14   Temp: 97.2 °F (36.2 °C)   SpO2: 98%   Weight: 216 lb (98 kg)   Height: 5' 7\" (1.702 m)      Wt Readings from Last 2 Encounters:   03/19/21 216 lb (98 kg)   02/22/21 213 lb (96.6 kg)     BP Readings from Last 3 Encounters:   03/19/21 (!) 140/82   02/07/20 (!) 140/92   01/20/20 (!) 150/94        Chief Complaint   Patient presents with   Cassie Lugo  at [facility] on PALMYRA, 2021     Allergies   Allergen Reactions    Hydrochlorothiazide Other (See Comments)     Hypokalemia/hypomagnesemia    Severe hyponatremia and hypokalemia     Outpatient Medications Marked as Taking for the 3/19/21 encounter (Office Visit) with Aden Rowland MD   Medication Sig Dispense Refill    metoprolol succinate (TOPROL XL) 100 MG extended release tablet Take 1 tablet by mouth once daily 90 tablet 0    ibuprofen (ADVIL;MOTRIN) 200 MG tablet Take 200 mg by mouth every 6 hours as needed for Pain      lisinopril (PRINIVIL;ZESTRIL) 20 MG tablet Take 1 tablet by mouth daily 90 tablet 1    omeprazole (PRILOSEC) 20 MG capsule Take 20 mg by mouth daily. This patient presents to the office today for a preoperative consultation at the request of surgeon, Dr. Curry Yang , who plans on performing L eft knee arthroscopy  on April 1 at [facility].  The current problem began 1 year ago, and symptoms have been worsening with time. Conservative therapy: Yes: knee indections, , which has been ineffective. .    Planned anesthesia: General   Known anesthesia problems: None   Bleeding risk: No recent or remote history of abnormal bleeding  Personal or FH of DVT/PE: No    Patient objection to receiving blood products: No    Patient Active Problem List   Diagnosis    Leg swelling    Varicose veins of left lower extremity with inflammation    Varicose veins of leg with pain    Hypertension       Past Medical History:   Diagnosis Date    GERD (gastroesophageal reflux disease)     Hypertension     Leg swelling     bilateral legs    Obesity (BMI 30-39. 9)     Varicose veins of left lower extremity with inflammation     bilateral legs    Varicose veins of leg with pain     bilateral legs     Past Surgical History:   Procedure Laterality Date    EYE SURGERY      lasix    EYE SURGERY      wondering eye    TUBAL LIGATION      open     Family History   Problem Relation Age of Onset    No Known Problems Mother     Heart Failure Father     High Blood Pressure Father     Cancer Maternal Grandfather         lung cancer (smoker)     Social History     Socioeconomic History    Marital status:      Spouse name: Not on file    Number of children: Not on file    Years of education: Not on file    Highest education level: Not on file   Occupational History    Not on file   Social Needs    Financial resource strain: Not on file    Food insecurity     Worry: Not on file     Inability: Not on file    Transportation needs     Medical: Not on file     Non-medical: Not on file   Tobacco Use    Smoking status: Former Smoker     Packs/day: 1.00     Years: 20.00     Pack years: 20.00     Types: Cigarettes     Quit date:      Years since quittin.2    Smokeless tobacco: Never Used   Substance and Sexual Activity    Alcohol use:  Yes     Alcohol/week: 4.0 standard drinks     Types: 4 Glasses of wine per week     Comment: occ    Drug use: No    Sexual activity: Yes     Partners: Male   Lifestyle    Physical activity     Days per week: Not on file     Minutes per session: Not on file    Stress: Not on file   Relationships    Social connections     Talks on phone: Not on file     Gets together: Not on file     Attends Cheondoism service: Not on file     Active member of club or organization: Not on file     Attends meetings of clubs or organizations: Not on file     Relationship status: Not on file    Intimate partner violence     Fear of current or ex partner: Not on file     Emotionally abused: Not on file     Physically abused: Not on file     Forced sexual activity: Not on file   Other Topics Concern    Not on file   Social History Narrative    Not on file       Review of Systems  A comprehensive review of systems was negative except for what was noted in the HPI. Physical Exam   Constitutional: She is oriented to person, place, and time. She appears well-developed and well-nourished. No distress. HENT:   Head: Normocephalic and atraumatic. Mouth/Throat: Uvula is midline, oropharynx is clear and moist and mucous membranes are normal.   Eyes: Conjunctivae and EOM are normal. Pupils are equal, round, and reactive to light. Neck: Trachea normal and normal range of motion. Neck supple. No JVD present. Carotid bruit is not present. No mass and no thyromegaly present. Cardiovascular: Normal rate, regular rhythm, normal heart sounds and intact distal pulses. Exam reveals no gallop and no friction rub. No murmur heard. Pulmonary/Chest: Effort normal and breath sounds normal. No respiratory distress. She has no wheezes. She has no rales. Abdominal: Soft. Normal aorta and bowel sounds are normal. She exhibits no distension and no mass. There is no hepatosplenomegaly. No tenderness. Musculoskeletal: She exhibits no edema and no tenderness. Neurological: She is alert and oriented to person, place, and time. She has normal strength. No cranial nerve deficit or sensory deficit. Coordination and gait normal.   Skin: Skin is warm and dry. No rash noted. No erythema. Psychiatric: She has a normal mood and affect. Her behavior is normal.     EKG Interpretation:  normal EKG, normal sinus rhythm.     Lab Review   Hospital Outpatient Visit on 03/19/2021   Component Date Value    Ventricular Rate 03/19/2021 86     Atrial Rate 03/19/2021 86     P-R Interval 03/19/2021 170     QRS Duration 03/19/2021 76     Q-T Interval 03/19/2021 352     QTc Calculation (Bazett) 03/19/2021 421     P Axis 03/19/2021 47     R Axis 03/19/2021 33     T Axis 03/19/2021 30     Diagnosis 03/19/2021 Normal sinus rhythmNormal ECG            Assessment:       64 y.o. patient with planned surgery as above. Known risk factors for perioperative complications: Hypertension  Current medications which may produce withdrawal symptoms if withheld perioperatively: none      Plan:     1. Preoperative workup as follows: hemoglobin, hematocrit, electrolytes, creatinine, glucose  2. Change in medication regimen before surgery: Discontinue ASA 7 days before surgery  3. Prophylaxis for cardiac events with perioperative beta-blockers: Currently taking  metoprolol  ACC/AHA indications for pre-operative beta-blocker use:    · Vascular surgery with history of postitive stress test  · Intermediate or high risk surgery with history of CAD   · Intermediate or high risk surgery with multiple clinical predictors of CAD- 2 of the following: history of compensated or prior heart failure, history of cerebrovascular disease, DM, or renal insufficiency    Routine administration of higher-dose, long-acting metoprolol in beta-blocker-naïve patients on the day of surgery, and in the absence of dose titration is associated with an overall increase in mortality. Beta-blockers should be started days to weeks prior to surgery and titrated to pulse < 70.  4. Deep vein thrombosis prophylaxis: regimen to be chosen by surgical team  5. No contraindications to planned surgery    Copy of this preoperative exam is available via electronic record for referring  surgeon.

## 2021-03-20 LAB
EKG ATRIAL RATE: 86 BPM
EKG DIAGNOSIS: NORMAL
EKG P AXIS: 47 DEGREES
EKG P-R INTERVAL: 170 MS
EKG Q-T INTERVAL: 352 MS
EKG QRS DURATION: 76 MS
EKG QTC CALCULATION (BAZETT): 421 MS
EKG R AXIS: 33 DEGREES
EKG T AXIS: 30 DEGREES
EKG VENTRICULAR RATE: 86 BPM

## 2021-03-20 PROCEDURE — 93010 ELECTROCARDIOGRAM REPORT: CPT | Performed by: INTERNAL MEDICINE

## 2021-03-22 ENCOUNTER — TELEPHONE (OUTPATIENT)
Dept: FAMILY MEDICINE CLINIC | Age: 56
End: 2021-03-22

## 2021-03-22 DIAGNOSIS — E87.1 HYPONATREMIA: Primary | ICD-10-CM

## 2021-03-22 RX ORDER — AMLODIPINE BESYLATE 10 MG/1
10 TABLET ORAL DAILY
Qty: 90 TABLET | Refills: 1 | Status: SHIPPED
Start: 2021-03-22 | End: 2021-03-25 | Stop reason: ALTCHOICE

## 2021-03-22 NOTE — TELEPHONE ENCOUNTER
Pt is wondering if the Lisinopril she is on may be causing her low sodium. She googled it and it says Lisinopril has been shown in pts that use it for a long period of time can have hyponatremia.     Please advise    Last OV:  3-19-21

## 2021-03-22 NOTE — TELEPHONE ENCOUNTER
Pt was on Amlodipine last April and had to D/C due to ankles swelling. . Should she just stay on the Lisinopril?

## 2021-03-25 RX ORDER — LISINOPRIL 10 MG/1
10 TABLET ORAL DAILY
COMMUNITY
End: 2021-06-01 | Stop reason: SDUPTHER

## 2021-03-25 NOTE — PROGRESS NOTES
surgery will be cancelled if you do not have a ride home. 8. A parent/legal guardian must accompany a child scheduled for surgery and plan to stay at the hospital until the child is discharged. Please do not bring other children with you. 9. Please wear simple, loose fitting clothing to the hospital.  Lashon Liter not bring valuables (money, credit cards, checkbooks, etc.) Do not wear any makeup (including no eye makeup) or nail polish on your fingers or toes. 10. DO NOT wear any jewelry or piercings on day of surgery. All body piercing jewelry must be removed. 11. If you have ___dentures, they will be removed before going to the OR; we will provide you a container. If you wear ___contact lenses or ___glasses, they will be removed; please bring a case for them. 12. Please see your family doctor/pediatrician for a history & physical and/or concerning medications. Bring any test results/reports from your physician's office. PCP____TUSSEY______________Phone___________H&P Appt. Date___3/19_____             13 If you  have a Living Will and Durable Power of  for Healthcare, please bring in a copy. 15. Notify your Surgeon if you develop any illness between now and surgery  time, cough, cold, fever, sore throat, nausea, vomiting, etc.  Please notify your surgeon if you experience dizziness, shortness of breath or blurred vision between now & the time of your surgery             15. DO NOT shave your operative site 96 hours prior to surgery. For face & neck surgery, men may use an electric razor 48 hours prior to surgery. 16. Shower the night before or morning of surgery using an antibacterial soap or as you have been instructed. 17. To provide excellent care visitors will be limited to one in the room at any given time. 18.  Please bring picture ID and insurance card.              19.  Visit our web site for additional information: [email or web address]              20.During flu season no children under the age of 15 are permitted in the hospital for the safety of all patients. 21. If you take a long acting insulin in the evening only  take half of your usual  dose the night  before your procedure              22. If you use a c-pap please bring DOS if staying overnight,             23.For your convenience [facility] has a pharmacy on site to fill your prescriptions. 24. If you use oxygen and have a portable tank please bring it  with you the DOS             25. Bring a complete list of all your medications with name and dose include any supplements. 26. Other__________________________________________   *Please call pre admission testing if you any further questions   Gin Win   [address]    [place] [phone] [place]  [phone]   [facility]    __ [facility] _____  _X_ Scheduled _3/26__ Where _[facility]__   __ Other __________      VISITOR POLICY(subject to change)    There is a one visitor policy at [facility] for all surgeries and endoscopies. Whether the visitor can stay or will be asked to wait in the car will depend on the current policy and if social distancing can be maintained. The policy is subject to change at any time. Please make sure the visitor has a cell phone that is on,charged and able to accept calls, as this may be the way that the staff communicates with them. Pain management is NO VISITOR policyThe patients ride is expected to remain in the car with a cell phone for communication. If the ride is leaving the hospital grounds please make sure they are back in time for pickup. Have the patient inform the staff on arrival what their rides plans are while the patient is in the facility. At the MAIN there is one visitor allowed. Please note that the visitor policy is subject to change.        All above information

## 2021-03-25 NOTE — PROGRESS NOTES
Pt has had recent preop lab results showing decreased Na, BUN, and Chloride. Reviewed pt status with Dr Wei White to confirm if \"carb loading\" preoperative instructions were appropriate. Stated that Instructions would be acceptable for pt at this time.

## 2021-03-26 ENCOUNTER — OFFICE VISIT (OUTPATIENT)
Dept: PRIMARY CARE CLINIC | Age: 56
End: 2021-03-26
Payer: COMMERCIAL

## 2021-03-26 DIAGNOSIS — Z01.818 PREOP EXAMINATION: Primary | ICD-10-CM

## 2021-03-26 DIAGNOSIS — E87.1 HYPONATREMIA: ICD-10-CM

## 2021-03-26 LAB
ANION GAP SERPL CALCULATED.3IONS-SCNC: 11 MMOL/L (ref 3–16)
BUN BLDV-MCNC: 5 MG/DL (ref 7–20)
CALCIUM SERPL-MCNC: 9.6 MG/DL (ref 8.3–10.6)
CHLORIDE BLD-SCNC: 96 MMOL/L (ref 99–110)
CO2: 26 MMOL/L (ref 21–32)
CREAT SERPL-MCNC: 0.6 MG/DL (ref 0.6–1.1)
GFR AFRICAN AMERICAN: >60
GFR NON-AFRICAN AMERICAN: >60
GLUCOSE BLD-MCNC: 121 MG/DL (ref 70–99)
POTASSIUM SERPL-SCNC: 4.3 MMOL/L (ref 3.5–5.1)
SARS-COV-2: NOT DETECTED
SODIUM BLD-SCNC: 133 MMOL/L (ref 136–145)

## 2021-03-26 PROCEDURE — 36415 COLL VENOUS BLD VENIPUNCTURE: CPT | Performed by: FAMILY MEDICINE

## 2021-03-26 PROCEDURE — 99211 OFF/OP EST MAY X REQ PHY/QHP: CPT | Performed by: NURSE PRACTITIONER

## 2021-03-31 ENCOUNTER — ANESTHESIA EVENT (OUTPATIENT)
Dept: OPERATING ROOM | Age: 56
End: 2021-03-31
Payer: COMMERCIAL

## 2021-04-01 ENCOUNTER — ANESTHESIA (OUTPATIENT)
Dept: OPERATING ROOM | Age: 56
End: 2021-04-01
Payer: COMMERCIAL

## 2021-04-01 ENCOUNTER — HOSPITAL ENCOUNTER (OUTPATIENT)
Age: 56
Setting detail: OUTPATIENT SURGERY
Discharge: HOME | End: 2021-04-01
Attending: ORTHOPAEDIC SURGERY | Admitting: ORTHOPAEDIC SURGERY
Payer: COMMERCIAL

## 2021-04-01 ENCOUNTER — APPOINTMENT (OUTPATIENT)
Dept: GENERAL RADIOLOGY | Age: 56
End: 2021-04-01
Attending: ORTHOPAEDIC SURGERY
Payer: COMMERCIAL

## 2021-04-01 VITALS
DIASTOLIC BLOOD PRESSURE: 100 MMHG | RESPIRATION RATE: 9 BRPM | OXYGEN SATURATION: 99 % | SYSTOLIC BLOOD PRESSURE: 148 MMHG

## 2021-04-01 VITALS
BODY MASS INDEX: 33.9 KG/M2 | DIASTOLIC BLOOD PRESSURE: 106 MMHG | SYSTOLIC BLOOD PRESSURE: 196 MMHG | TEMPERATURE: 97.9 F | RESPIRATION RATE: 15 BRPM | WEIGHT: 216 LBS | HEART RATE: 82 BPM | OXYGEN SATURATION: 100 % | HEIGHT: 67 IN

## 2021-04-01 DIAGNOSIS — Z98.890 S/P LEFT KNEE ARTHROSCOPY: Primary | ICD-10-CM

## 2021-04-01 LAB
GLUCOSE BLD-MCNC: 106 MG/DL (ref 70–99)
PERFORMED ON: ABNORMAL

## 2021-04-01 PROCEDURE — 2580000003 HC RX 258: Performed by: ORTHOPAEDIC SURGERY

## 2021-04-01 PROCEDURE — 2709999900 HC NON-CHARGEABLE SUPPLY: Performed by: ORTHOPAEDIC SURGERY

## 2021-04-01 PROCEDURE — 2500000003 HC RX 250 WO HCPCS: Performed by: ANESTHESIOLOGY

## 2021-04-01 PROCEDURE — 3209999900 FLUORO FOR SURGICAL PROCEDURES

## 2021-04-01 PROCEDURE — 7100000001 HC PACU RECOVERY - ADDTL 15 MIN: Performed by: ORTHOPAEDIC SURGERY

## 2021-04-01 PROCEDURE — 3600000014 HC SURGERY LEVEL 4 ADDTL 15MIN: Performed by: ORTHOPAEDIC SURGERY

## 2021-04-01 PROCEDURE — 7100000000 HC PACU RECOVERY - FIRST 15 MIN: Performed by: ORTHOPAEDIC SURGERY

## 2021-04-01 PROCEDURE — 2500000003 HC RX 250 WO HCPCS: Performed by: NURSE ANESTHETIST, CERTIFIED REGISTERED

## 2021-04-01 PROCEDURE — 3700000001 HC ADD 15 MINUTES (ANESTHESIA): Performed by: ORTHOPAEDIC SURGERY

## 2021-04-01 PROCEDURE — 7100000010 HC PHASE II RECOVERY - FIRST 15 MIN: Performed by: ORTHOPAEDIC SURGERY

## 2021-04-01 PROCEDURE — 6370000000 HC RX 637 (ALT 250 FOR IP): Performed by: ANESTHESIOLOGY

## 2021-04-01 PROCEDURE — 3700000000 HC ANESTHESIA ATTENDED CARE: Performed by: ORTHOPAEDIC SURGERY

## 2021-04-01 PROCEDURE — 6360000002 HC RX W HCPCS: Performed by: ANESTHESIOLOGY

## 2021-04-01 PROCEDURE — 29881 ARTHRS KNE SRG MNISECTMY M/L: CPT | Performed by: ORTHOPAEDIC SURGERY

## 2021-04-01 PROCEDURE — 6360000002 HC RX W HCPCS: Performed by: ORTHOPAEDIC SURGERY

## 2021-04-01 PROCEDURE — 29855 TIBIAL ARTHROSCOPY/SURGERY: CPT | Performed by: ORTHOPAEDIC SURGERY

## 2021-04-01 PROCEDURE — 6360000002 HC RX W HCPCS: Performed by: NURSE ANESTHETIST, CERTIFIED REGISTERED

## 2021-04-01 PROCEDURE — 2500000003 HC RX 250 WO HCPCS: Performed by: ORTHOPAEDIC SURGERY

## 2021-04-01 PROCEDURE — C1713 ANCHOR/SCREW BN/BN,TIS/BN: HCPCS | Performed by: ORTHOPAEDIC SURGERY

## 2021-04-01 PROCEDURE — 3600000004 HC SURGERY LEVEL 4 BASE: Performed by: ORTHOPAEDIC SURGERY

## 2021-04-01 PROCEDURE — 7100000011 HC PHASE II RECOVERY - ADDTL 15 MIN: Performed by: ORTHOPAEDIC SURGERY

## 2021-04-01 RX ORDER — SODIUM CHLORIDE, SODIUM LACTATE, POTASSIUM CHLORIDE, CALCIUM CHLORIDE 600; 310; 30; 20 MG/100ML; MG/100ML; MG/100ML; MG/100ML
INJECTION, SOLUTION INTRAVENOUS CONTINUOUS
Status: DISCONTINUED | OUTPATIENT
Start: 2021-04-01 | End: 2021-04-01 | Stop reason: HOSPADM

## 2021-04-01 RX ORDER — PROMETHAZINE HYDROCHLORIDE 25 MG/1
25 TABLET ORAL EVERY 6 HOURS PRN
Qty: 15 TABLET | Refills: 0 | Status: SHIPPED | OUTPATIENT
Start: 2021-04-01 | End: 2021-04-08

## 2021-04-01 RX ORDER — FENTANYL CITRATE 50 UG/ML
INJECTION, SOLUTION INTRAMUSCULAR; INTRAVENOUS PRN
Status: DISCONTINUED | OUTPATIENT
Start: 2021-04-01 | End: 2021-04-01 | Stop reason: SDUPTHER

## 2021-04-01 RX ORDER — ONDANSETRON 2 MG/ML
INJECTION INTRAMUSCULAR; INTRAVENOUS PRN
Status: DISCONTINUED | OUTPATIENT
Start: 2021-04-01 | End: 2021-04-01 | Stop reason: SDUPTHER

## 2021-04-01 RX ORDER — BUPIVACAINE HYDROCHLORIDE 2.5 MG/ML
INJECTION, SOLUTION EPIDURAL; INFILTRATION; INTRACAUDAL
Status: COMPLETED | OUTPATIENT
Start: 2021-04-01 | End: 2021-04-01

## 2021-04-01 RX ORDER — SODIUM CHLORIDE 0.9 % (FLUSH) 0.9 %
10 SYRINGE (ML) INJECTION PRN
Status: DISCONTINUED | OUTPATIENT
Start: 2021-04-01 | End: 2021-04-01 | Stop reason: HOSPADM

## 2021-04-01 RX ORDER — ACETAMINOPHEN 500 MG
1000 TABLET ORAL EVERY 6 HOURS PRN
Status: ON HOLD | COMMUNITY
End: 2021-04-01 | Stop reason: HOSPADM

## 2021-04-01 RX ORDER — LABETALOL HYDROCHLORIDE 5 MG/ML
5 INJECTION, SOLUTION INTRAVENOUS EVERY 10 MIN PRN
Status: DISCONTINUED | OUTPATIENT
Start: 2021-04-01 | End: 2021-04-01 | Stop reason: HOSPADM

## 2021-04-01 RX ORDER — MIDAZOLAM HYDROCHLORIDE 2 MG/2ML
2 INJECTION, SOLUTION INTRAMUSCULAR; INTRAVENOUS ONCE
Status: COMPLETED | OUTPATIENT
Start: 2021-04-01 | End: 2021-04-01

## 2021-04-01 RX ORDER — KETOROLAC TROMETHAMINE 30 MG/ML
INJECTION, SOLUTION INTRAMUSCULAR; INTRAVENOUS PRN
Status: DISCONTINUED | OUTPATIENT
Start: 2021-04-01 | End: 2021-04-01 | Stop reason: SDUPTHER

## 2021-04-01 RX ORDER — LIDOCAINE HYDROCHLORIDE 20 MG/ML
INJECTION, SOLUTION INFILTRATION; PERINEURAL PRN
Status: DISCONTINUED | OUTPATIENT
Start: 2021-04-01 | End: 2021-04-01 | Stop reason: SDUPTHER

## 2021-04-01 RX ORDER — LIDOCAINE HYDROCHLORIDE 10 MG/ML
1 INJECTION, SOLUTION EPIDURAL; INFILTRATION; INTRACAUDAL; PERINEURAL
Status: DISCONTINUED | OUTPATIENT
Start: 2021-04-01 | End: 2021-04-01 | Stop reason: HOSPADM

## 2021-04-01 RX ORDER — FENTANYL CITRATE 50 UG/ML
25 INJECTION, SOLUTION INTRAMUSCULAR; INTRAVENOUS EVERY 5 MIN PRN
Status: DISCONTINUED | OUTPATIENT
Start: 2021-04-01 | End: 2021-04-01 | Stop reason: HOSPADM

## 2021-04-01 RX ORDER — HYDROCODONE BITARTRATE AND ACETAMINOPHEN 5; 325 MG/1; MG/1
1 TABLET ORAL
Status: COMPLETED | OUTPATIENT
Start: 2021-04-01 | End: 2021-04-01

## 2021-04-01 RX ORDER — SODIUM CHLORIDE 0.9 % (FLUSH) 0.9 %
10 SYRINGE (ML) INJECTION EVERY 12 HOURS SCHEDULED
Status: DISCONTINUED | OUTPATIENT
Start: 2021-04-01 | End: 2021-04-01 | Stop reason: HOSPADM

## 2021-04-01 RX ORDER — HYDROCODONE BITARTRATE AND ACETAMINOPHEN 5; 325 MG/1; MG/1
1 TABLET ORAL EVERY 6 HOURS PRN
Qty: 20 TABLET | Refills: 0 | Status: SHIPPED | OUTPATIENT
Start: 2021-04-01 | End: 2021-04-16 | Stop reason: SDUPTHER

## 2021-04-01 RX ORDER — LIDOCAINE HYDROCHLORIDE 10 MG/ML
0.5 INJECTION, SOLUTION EPIDURAL; INFILTRATION; INTRACAUDAL; PERINEURAL ONCE
Status: DISCONTINUED | OUTPATIENT
Start: 2021-04-01 | End: 2021-04-01 | Stop reason: HOSPADM

## 2021-04-01 RX ORDER — DEXAMETHASONE SODIUM PHOSPHATE 4 MG/ML
INJECTION, SOLUTION INTRA-ARTICULAR; INTRALESIONAL; INTRAMUSCULAR; INTRAVENOUS; SOFT TISSUE PRN
Status: DISCONTINUED | OUTPATIENT
Start: 2021-04-01 | End: 2021-04-01 | Stop reason: SDUPTHER

## 2021-04-01 RX ORDER — HYDRALAZINE HYDROCHLORIDE 20 MG/ML
5 INJECTION INTRAMUSCULAR; INTRAVENOUS EVERY 10 MIN PRN
Status: DISCONTINUED | OUTPATIENT
Start: 2021-04-01 | End: 2021-04-01 | Stop reason: HOSPADM

## 2021-04-01 RX ORDER — ONDANSETRON 2 MG/ML
4 INJECTION INTRAMUSCULAR; INTRAVENOUS
Status: DISCONTINUED | OUTPATIENT
Start: 2021-04-01 | End: 2021-04-01 | Stop reason: HOSPADM

## 2021-04-01 RX ORDER — PROPOFOL 10 MG/ML
INJECTION, EMULSION INTRAVENOUS PRN
Status: DISCONTINUED | OUTPATIENT
Start: 2021-04-01 | End: 2021-04-01 | Stop reason: SDUPTHER

## 2021-04-01 RX ORDER — PROCHLORPERAZINE EDISYLATE 5 MG/ML
5 INJECTION INTRAMUSCULAR; INTRAVENOUS
Status: DISCONTINUED | OUTPATIENT
Start: 2021-04-01 | End: 2021-04-01 | Stop reason: HOSPADM

## 2021-04-01 RX ORDER — IBUPROFEN 800 MG/1
800 TABLET ORAL EVERY 8 HOURS PRN
Qty: 30 TABLET | Refills: 0 | Status: SHIPPED | OUTPATIENT
Start: 2021-04-01 | End: 2021-04-16 | Stop reason: SDUPTHER

## 2021-04-01 RX ORDER — HYDROMORPHONE HCL 110MG/55ML
0.5 PATIENT CONTROLLED ANALGESIA SYRINGE INTRAVENOUS EVERY 5 MIN PRN
Status: DISCONTINUED | OUTPATIENT
Start: 2021-04-01 | End: 2021-04-01 | Stop reason: HOSPADM

## 2021-04-01 RX ADMIN — CEFAZOLIN SODIUM 2000 MG: 10 INJECTION, POWDER, FOR SOLUTION INTRAVENOUS at 06:51

## 2021-04-01 RX ADMIN — PROPOFOL 200 MG: 10 INJECTION, EMULSION INTRAVENOUS at 07:02

## 2021-04-01 RX ADMIN — ONDANSETRON 4 MG: 2 INJECTION INTRAMUSCULAR; INTRAVENOUS at 07:26

## 2021-04-01 RX ADMIN — HYDROCODONE BITARTRATE AND ACETAMINOPHEN 1 TABLET: 5; 325 TABLET ORAL at 08:44

## 2021-04-01 RX ADMIN — MIDAZOLAM 2 MG: 1 INJECTION INTRAMUSCULAR; INTRAVENOUS at 06:45

## 2021-04-01 RX ADMIN — HYDROMORPHONE HYDROCHLORIDE 0.5 MG: 2 INJECTION, SOLUTION INTRAMUSCULAR; INTRAVENOUS; SUBCUTANEOUS at 08:20

## 2021-04-01 RX ADMIN — DEXAMETHASONE SODIUM PHOSPHATE 12 MG: 4 INJECTION, SOLUTION INTRAMUSCULAR; INTRAVENOUS at 07:26

## 2021-04-01 RX ADMIN — SODIUM CHLORIDE, POTASSIUM CHLORIDE, SODIUM LACTATE AND CALCIUM CHLORIDE: 600; 310; 30; 20 INJECTION, SOLUTION INTRAVENOUS at 06:36

## 2021-04-01 RX ADMIN — KETOROLAC TROMETHAMINE 30 MG: 60 INJECTION, SOLUTION INTRAMUSCULAR at 07:32

## 2021-04-01 RX ADMIN — FENTANYL CITRATE 50 MCG: 50 INJECTION, SOLUTION INTRAMUSCULAR; INTRAVENOUS at 07:06

## 2021-04-01 RX ADMIN — SODIUM CHLORIDE, POTASSIUM CHLORIDE, SODIUM LACTATE AND CALCIUM CHLORIDE: 600; 310; 30; 20 INJECTION, SOLUTION INTRAVENOUS at 06:59

## 2021-04-01 RX ADMIN — FENTANYL CITRATE 50 MCG: 50 INJECTION, SOLUTION INTRAMUSCULAR; INTRAVENOUS at 07:31

## 2021-04-01 RX ADMIN — HYDROMORPHONE HYDROCHLORIDE 0.5 MG: 2 INJECTION, SOLUTION INTRAMUSCULAR; INTRAVENOUS; SUBCUTANEOUS at 08:30

## 2021-04-01 RX ADMIN — LIDOCAINE HYDROCHLORIDE 100 MG: 20 INJECTION, SOLUTION INFILTRATION; PERINEURAL at 07:07

## 2021-04-01 RX ADMIN — LABETALOL HYDROCHLORIDE 5 MG: 5 INJECTION INTRAVENOUS at 08:13

## 2021-04-01 ASSESSMENT — PULMONARY FUNCTION TESTS
PIF_VALUE: 2
PIF_VALUE: 22
PIF_VALUE: 0
PIF_VALUE: 2
PIF_VALUE: 2
PIF_VALUE: 3
PIF_VALUE: 0
PIF_VALUE: 18
PIF_VALUE: 3
PIF_VALUE: 3
PIF_VALUE: 2
PIF_VALUE: 12
PIF_VALUE: 3
PIF_VALUE: 2
PIF_VALUE: 17
PIF_VALUE: 2
PIF_VALUE: 16
PIF_VALUE: 3
PIF_VALUE: 4
PIF_VALUE: 1
PIF_VALUE: 3
PIF_VALUE: 21
PIF_VALUE: 3
PIF_VALUE: 0
PIF_VALUE: 3
PIF_VALUE: 18
PIF_VALUE: 18
PIF_VALUE: 20
PIF_VALUE: 3
PIF_VALUE: 1
PIF_VALUE: 1
PIF_VALUE: 24
PIF_VALUE: 0
PIF_VALUE: 4

## 2021-04-01 ASSESSMENT — PAIN SCALES - GENERAL
PAINLEVEL_OUTOF10: 4
PAINLEVEL_OUTOF10: 7
PAINLEVEL_OUTOF10: 7

## 2021-04-01 ASSESSMENT — PAIN DESCRIPTION - PAIN TYPE
TYPE: SURGICAL PAIN

## 2021-04-01 ASSESSMENT — ENCOUNTER SYMPTOMS: SHORTNESS OF BREATH: 0

## 2021-04-01 ASSESSMENT — PAIN - FUNCTIONAL ASSESSMENT: PAIN_FUNCTIONAL_ASSESSMENT: 0-10

## 2021-04-01 NOTE — ANESTHESIA POSTPROCEDURE EVALUATION
Department of Anesthesiology  Postprocedure Note    Patient: Nicolas Jacobo  MRN: [id number]  YOB: 1965  Date of evaluation: 4/1/2021  Time:  10:06 AM     Procedure Summary     Date: 04/01/21 Room / Location: [address]    Anesthesia Start: 3409 Anesthesia Stop: 0174    Procedure: LEFT KNEE ARTHROSCOPY PARTIAL MEDIAL MENISCECTOMY, REPAIR OF TIBIAL PLATEAU FRACTURE (Left ) Diagnosis: (Y77.337W  LEFT MEDIAL MENISCUS TEAR)    Surgeons: Sapphire Patricio MD Responsible Provider: Brandon Nix MD    Anesthesia Type: general ASA Status: 2          Anesthesia Type: general    Dwight Phase I: Dwight Score: 10    Dwight Phase II: Dwight Score: 10    Last vitals: Reviewed and per EMR flowsheets.        Anesthesia Post Evaluation    Patient location during evaluation: PACU  Patient participation: complete - patient participated  Level of consciousness: awake and alert  Airway patency: patent  Nausea & Vomiting: no nausea and no vomiting  Complications: no  Cardiovascular status: hemodynamically stable  Respiratory status: acceptable  Hydration status: stable

## 2021-04-01 NOTE — ANESTHESIA PRE PROCEDURE
inflammation I83.12    Varicose veins of leg with pain I83.819    Hypertension I10       Past Medical History:        Diagnosis Date    GERD (gastroesophageal reflux disease)     Hypertension     Leg swelling     bilateral legs    Obesity (BMI 30-39. 9)     Varicose veins of left lower extremity with inflammation     bilateral legs    Varicose veins of leg with pain     bilateral legs       Past Surgical History:        Procedure Laterality Date    EYE SURGERY      lasix    EYE SURGERY      wondering eye    TUBAL LIGATION      open       Social History:    Social History     Tobacco Use    Smoking status: Former Smoker     Packs/day: 1.00     Years: 20.00     Pack years: 20.00     Types: Cigarettes     Quit date:      Years since quittin.2    Smokeless tobacco: Never Used   Substance Use Topics    Alcohol use: Yes     Alcohol/week: 4.0 standard drinks     Types: 4 Glasses of wine per week     Comment: occ                                Counseling given: Not Answered      Vital Signs (Current):   Vitals:    21 1105 21 0601 21 0610 21 0615   BP:   (!) 201/124 (!) 216/121   Pulse:   79    Resp:   16    Temp:   98.4 °F (36.9 °C)    TempSrc:   Temporal    SpO2:   100%    Weight: 207 lb (93.9 kg) 216 lb (98 kg)     Height: 5' 7\" (1.702 m) 5' 7\" (1.702 m)                                                BP Readings from Last 3 Encounters:   21 (!) 216/121   21 (!) 140/82   20 (!) 140/92       NPO Status: Time of last liquid consumption: 0000                        Time of last solid consumption: 0000                        Date of last liquid consumption: 21                        Date of last solid food consumption: 21    BMI:   Wt Readings from Last 3 Encounters:   21 216 lb (98 kg)   21 216 lb (98 kg)   21 213 lb (96.6 kg)     Body mass index is 33.83 kg/m².     CBC:   Lab Results   Component Value Date    WBC 5.1 2021 RBC 3.91 03/19/2021    HGB 12.8 03/19/2021    HCT 37.4 03/19/2021    MCV 95.8 03/19/2021    RDW 13.7 03/19/2021     03/19/2021       CMP:   Lab Results   Component Value Date     03/26/2021    K 4.3 03/26/2021    CL 96 03/26/2021    CO2 26 03/26/2021    BUN 5 03/26/2021    CREATININE 0.6 03/26/2021    GFRAA >60 03/26/2021    GFRAA >60 08/02/2012    AGRATIO 1.6 12/11/2018    LABGLOM >60 03/26/2021    GLUCOSE 121 03/26/2021    PROT 7.8 12/11/2018    PROT 7.6 08/02/2012    CALCIUM 9.6 03/26/2021    BILITOT 0.5 12/11/2018    ALKPHOS 98 12/11/2018    AST 22 12/11/2018    ALT 21 12/11/2018       POC Tests: No results for input(s): POCGLU, POCNA, POCK, POCCL, POCBUN, POCHEMO, POCHCT in the last 72 hours. Coags: No results found for: PROTIME, INR, APTT    HCG (If Applicable): No results found for: PREGTESTUR, PREGSERUM, HCG, HCGQUANT     ABGs: No results found for: PHART, PO2ART, GLI1LBV, LSL4FIF, BEART, E3MTKUUO     Type & Screen (If Applicable):  No results found for: LABABO, LABRH    Drug/Infectious Status (If Applicable):  No results found for: HIV, HEPCAB    COVID-19 Screening (If Applicable):   Lab Results   Component Value Date    COVID19 Not Detected 03/26/2021           Anesthesia Evaluation  Patient summary reviewed and Nursing notes reviewed no history of anesthetic complications:   Airway: Mallampati: I  TM distance: >3 FB   Neck ROM: full  Mouth opening: > = 3 FB Dental: normal exam         Pulmonary:       (-) asthma and shortness of breath                           Cardiovascular:    (+) hypertension:,     (-)  angina                Neuro/Psych:      (-) CVA           GI/Hepatic/Renal:   (+) GERD:,      (-) liver disease       Endo/Other:        (-) diabetes mellitus, hypothyroidism               Abdominal:           Vascular:     - PVD. Anesthesia Plan      general     ASA 2       Induction: intravenous.     MIPS: Postoperative opioids intended and Prophylactic antiemetics administered. Anesthetic plan and risks discussed with patient. Use of blood products discussed with patient whom. Plan discussed with CRNA.                   Pablo Ivey MD   4/1/2021

## 2021-04-01 NOTE — PROGRESS NOTES
CLINICAL PHARMACY NOTE: MEDS TO [address] Select Patient?: No  Total # of Prescriptions Filled: 3   The following medications were delivered to the patient:  · Promethazine 25mg  · Ibuprofen 800mg  · Norco 5-325mg  Total # of Interventions Completed: 0  Time Spent (min): 30    Additional Documentation:    Delivered to Patient   Darío Figueroa CPhT

## 2021-04-01 NOTE — H&P
Patient seen and examined. I have reviewed the history and physical and examined the patient and find no relevant changes. Surgery plan reviewed. The patient was counseled at length about the risks of alek Covid-19 during their perioperative period and any recovery window from their procedure. The patient was made aware that alek Covid-19  may worsen their prognosis for recovering from their procedure  and lend to a higher morbidity and/or mortality risk. All material risks, benefits, and reasonable alternatives including postponing the procedure were discussed. The patient does wish to proceed with the procedure at this time. Site marked and consent verified. All questions answered and antibiotic verified. Ready for left knee arthroscopy, partial medial meniscectomy and calcium sulfate injection into tibial plateau fracture under anesthesia. Samuel Tracy, [address] [place] [address] and Sports Medicine  04/01/21  6:42 AM

## 2021-04-01 NOTE — PROGRESS NOTES
Teaching/ education completed for home care including pain management, wound care,activity,safety precautions and infection control. Patient verbalized understanding.

## 2021-04-01 NOTE — OP NOTE
Operative Note      Patient: Jose Monterroso  YOB: 1965  MRN: [id number]    Date of Procedure: 4/1/2021    Pre-Op Diagnosis: S83.242A  LEFT MEDIAL MENISCUS TEAR and stress fracture left medial tibial plateau    Post-Op Diagnosis: Same       Procedure(s):  LEFT KNEE ARTHROSCOPY PARTIAL MEDIAL MENISCECTOMY, REPAIR OF TIBIAL PLATEAU FRACTURE    Surgeon(s):  Claudio Silva MD    Assistant:   * No surgical staff found *    Anesthesia: General    Estimated Blood Loss (mL): less than 50     Complications: None    Specimens:   * No specimens in log *    Implants:  Implant Name Type Inv. Item Serial No.  Lot No. LRB No. Used Action   SCP KNEE KIT    [employer]WD [id number] Left 1 Implanted         Drains: * No LDAs found *    Findings: see scope photos. Degenerative changes and medial meniscal degenerative tear in the medial compartment. Grade 4 patellofemoral cartilage damage. Small chondral fissures in the lateral compartment    Detailed Description of Procedure:     Condition:  Stable    Weight Bearing Status:  Weight bearing as tolerated    Activity:  Activity as tolerated (Patient may move about with assist as indicated or with supervision.)    Operative Report: Indications: Jose Monterroso is a 64 y.o. female with history of left knee symptomatic medial meniscal tear and subchondral stress fracture of the medial tibial plateau. This has been confirmed by MRI and her symptoms have note resolved with conservative treatment. The option of arthroscopic intervention has been presented and she has elected to proceed. I have reviewed with her the risk, benefits, and potential outcomes / complications and she is prepared to proceed. Her consent was obtained and all questions answered to her satisfaction. Description:  Jose Monterroso was identified in the preoperative holding area and the correct site of the left knee was marked.  She was brought to the operating room and placed on the table in supine position. General anesthesia was administered. Surgical time out was called verifying necessary data including administration of preoperative antibiotics and the correct surgical site. The left lower extremity had a nonsterile tourniquet applied to the left thigh and was secured in a low-profile arthroscopic limb everett. The right lower extremity was placed in a limb stirrup with care to ensure appropriate padding and the perineal nerve distribution. The operative limb was exsanguinated with an Esmarch bandage and the tourniquet inflated to level of 300 mmHg. The left lower extremity was prepped and draped in standard sterile fashion. #11 blade was used to create an anterior inferior lateral portal.  The trocar and cannula was inserted into the knee and placed into the suprapatellar pouch. Lactated Ringer's fluid flow was initiated via gravity. The camera was inserted and the suprapatellar pouch was visualized. The patella articular cartilage showed denuded cartilage and areas of eburnated bone along many of the facets and the trochlea showed cobblestone changes along the superior aspect of the femoral trochlea medially and laterally. There were no loose bodies noted in the suprapatellar pouch and no significant plica was encountered. The camera was directed to the medial compartment and a medial portal established under direct visualization. The femoral articular cartilage showed grade 3 chondral damage throughout the weightbearing articular surface and the tibial articular cartilage showed areas of grade 3 and an area of grade 4 damage overlying the subchondral stress area identified on her MRI. The medial meniscus was probed and showed a degenerative tear from the posterior root to the posterior horn region in zones one and two. It was trimmed to a rim of stable tissue using arthroscopic biters and a 3.5 mm motorized shaver.   In total the portion removed was about 15 % of the meniscal volume. Chondral surfaces within the medial compartment were also smoothed using the 3.5 mm motorized shaver. Attention was turned to the treatment of the stress fracture and using arthroscopy as well as fluoroscopy the trocar for the calcium phosphate cement was drilled into position. Was verified by fluoroscopy and 3 cc of calcium phosphate cement were then injected under fluoroscopic guidance with good fill to the defect. This was allowed to sit for 10 minutes while we continued with the arthroscopy. Upon reintroducing the camera into the joint there is no evidence of intra-articular injection. The intercondylar notch was examined and showed Intact ACL fibers with some laxity and PCL fibers showed good tension. Gilquist maneuver was performed showing no loose bodies posteriorly. The lateral compartment was entered and the meniscus was probed. There were areas of fissuring in the central portion of the tibial and femoral cartilage which were lightly smoothed with a 3.5 mm motorized shaver. The medial and lateral gutters were checked for loose bodies and none were noted. A significant osteophyte ridge along the medial femoral condyle was taken down using arthroscopic shaver. After 10 minutes the injection port cannula was removed and we verified no cement substrate leaking into the joint. Was a little bit in the skin which was thoroughly irrigated. The instruments and arthroscope were returned to the suprapatellar pouch. The knee was thoroughly irrigated. Instruments and arthroscope were removed. Portal sites were closed with 4-0 Monocryl and covered with Steri-Strips. 30 mL of 0.25 percent Marcaine plain were instilled for postoperative analgesia. Dry sterile dressings were applied. The limb was wrapped from the foot to the thigh with an Ace bandage.   The tourniquet was deflated and the patient was awakened and taken to recovery room in stable condition with toes noted to be warm and pink. All needle and sponge counts matched the initial count per circulating and scrub personnel x2 prior ending this case. Electronically signed by:  Deanna Tracy, [address] [place] [address] and Sports Medicine  4/1/21  8:21 AM EDT        Electronically signed by Patrick Vergara MD on 4/1/2021 at 8:20 AM

## 2021-04-01 NOTE — PROGRESS NOTES
Teaching/ education completed for home care including pain management, wound care,activtiy,safety precautions and infection control. Patient verbalized understanding.

## 2021-04-16 ENCOUNTER — TELEPHONE (OUTPATIENT)
Dept: ORTHOPEDIC SURGERY | Age: 56
End: 2021-04-16

## 2021-04-16 ENCOUNTER — OFFICE VISIT (OUTPATIENT)
Dept: ORTHOPEDIC SURGERY | Age: 56
End: 2021-04-16

## 2021-04-16 VITALS — WEIGHT: 216 LBS | BODY MASS INDEX: 33.9 KG/M2 | HEIGHT: 67 IN

## 2021-04-16 DIAGNOSIS — M84.362D STRESS FRACTURE OF LEFT TIBIA WITH ROUTINE HEALING, SUBSEQUENT ENCOUNTER: ICD-10-CM

## 2021-04-16 DIAGNOSIS — S83.232D COMPLEX TEAR OF MEDIAL MENISCUS OF LEFT KNEE AS CURRENT INJURY, SUBSEQUENT ENCOUNTER: Primary | ICD-10-CM

## 2021-04-16 PROCEDURE — 99024 POSTOP FOLLOW-UP VISIT: CPT | Performed by: PHYSICIAN ASSISTANT

## 2021-04-16 RX ORDER — IBUPROFEN 800 MG/1
800 TABLET ORAL EVERY 8 HOURS
Qty: 90 TABLET | Refills: 1 | Status: SHIPPED
Start: 2021-04-16 | End: 2021-04-16 | Stop reason: CLARIF

## 2021-04-16 RX ORDER — HYDROCODONE BITARTRATE AND ACETAMINOPHEN 5; 325 MG/1; MG/1
1 TABLET ORAL EVERY 6 HOURS PRN
Qty: 28 TABLET | Refills: 0 | Status: SHIPPED | OUTPATIENT
Start: 2021-04-16 | End: 2021-04-23

## 2021-04-16 RX ORDER — HYDROCODONE BITARTRATE AND ACETAMINOPHEN 5; 325 MG/1; MG/1
1 TABLET ORAL EVERY 6 HOURS PRN
Qty: 28 TABLET | Refills: 0 | Status: SHIPPED
Start: 2021-04-16 | End: 2021-04-16 | Stop reason: CLARIF

## 2021-04-16 RX ORDER — IBUPROFEN 800 MG/1
800 TABLET ORAL EVERY 8 HOURS
Qty: 90 TABLET | Refills: 1 | Status: SHIPPED | OUTPATIENT
Start: 2021-04-16 | End: 2021-06-04 | Stop reason: SDUPTHER

## 2021-04-16 NOTE — TELEPHONE ENCOUNTER
General Question     Subject: [address] . SCRIPT NEEDS TO BE SENT TO LOCAL PHARMACY. - [address] [facility]  Patient and /or Facility Request:   Contact Number: [phone].

## 2021-04-16 NOTE — PROGRESS NOTES
[facility] [id number]  YOB: 1965  Date of Encounter: 4/16/2021     Chief Complaint   Patient presents with    Post-Op Check     LEFT KNEE ARTHROSCOPY PARTIAL MEDIAL MENISCECTOMY, REPAIR OF TIBIAL PLATEAU FRACTURE; DOS 4/1/21. History of Present Illness:   Ms. Huber Alvarado is here in follow up regarding her left knee diagnostic video arthroscopy with partial medial meniscectomy and calcium phosphate injection of tibial plateau fracture. Patient rates her pain a 3/10. She has been taking ibuprofen as needed. She states she still has moderate swelling of the left knee. Bruising is improving. She is still struggling some with range of motion and stiffness. The patient's past medical history, medications, allergies, family history, social history, and review of systems have been reviewed, and dated and are recorded in the chart under the 'MEDIA\" tab. Physical Exam:    Ms. Huber Alvarado appears well, she is in no apparent distress, she demonstrates appropriate mood & affect. She is alert and oriented to person, place and time. Ht 5' 7\" (1.702 m)   Wt 216 lb (98 kg)   BMI 33.83 kg/m²     On examination of patient's left knee there is moderate postsurgical swelling which is improving as expected. Patient is able to achieve 0 degrees of extension with assistance. She has flexion to about 80 degrees.        Orders:  Orders Placed This Encounter   Procedures    [facility]       Impression:   Diagnosis Orders   1. 4/1/21 LEFT knee partial medial meniscectomy  ibuprofen (ADVIL;MOTRIN) 800 MG tablet    HYDROcodone-acetaminophen (NORCO) 5-325 MG per tablet    [facility]   2. 4/1/21 LEFT knee tibial plateau stress fracture with calcium phosphate injection  ibuprofen (ADVIL;MOTRIN) 800 MG tablet    HYDROcodone-acetaminophen (NORCO) 5-325 MG per tablet    [address] Treatment Plan:    Patient is 2 weeks postop and doing well. Pain is slowly improving. She is given refills of ibuprofen and hydrocodone. She is struggling some with range of motion and stiffness of the left knee. I do feel she will benefit from physical therapy and an order is placed. She will continue with home exercises and stretches. She is advised to continue resting, icing and elevating the left knee. She has been back to work where she works from home. She will plan on following back in 3 weeks or before that time with any concerns. Dagmar Loya was informed of the results of any imaging. We discussed treatment options and a time was given to answer questions. A plan was proposed and Dagmar Loya understand and accepts this course of care. Electronically signed by Roberto Aguirre PA-C on 6/99/7673  Board Certified [facility]    Please note that portions of this note were completed with a voice recognition program.  Efforts were made to edit the dictations but occasionally words are mis-transcribed.

## 2021-04-16 NOTE — TELEPHONE ENCOUNTER
LVM for patient to call and let us know which of the 3 local pharmacies in her chart she would like for this to be sent to.

## 2021-04-27 ENCOUNTER — PATIENT MESSAGE (OUTPATIENT)
Dept: FAMILY MEDICINE CLINIC | Age: 56
End: 2021-04-27

## 2021-04-27 NOTE — TELEPHONE ENCOUNTER
From: Shaylee Tomas  To: Yolanda Venegas MD  Sent: 4/27/2021 12:24 PM EDT  Subject: Prescription Question    Have an outbreak of eczema and need a new tube of Triamcinolone acetonide ointment USP 0.1% 80G tube please.

## 2021-04-28 ENCOUNTER — HOSPITAL ENCOUNTER (OUTPATIENT)
Dept: PHYSICAL THERAPY | Age: 56
Setting detail: THERAPIES SERIES
Discharge: HOME | End: 2021-04-28
Payer: COMMERCIAL

## 2021-04-28 PROCEDURE — 97110 THERAPEUTIC EXERCISES: CPT | Performed by: PHYSICAL THERAPIST

## 2021-04-28 PROCEDURE — 97161 PT EVAL LOW COMPLEX 20 MIN: CPT | Performed by: PHYSICAL THERAPIST

## 2021-04-28 PROCEDURE — 97016 VASOPNEUMATIC DEVICE THERAPY: CPT | Performed by: PHYSICAL THERAPIST

## 2021-04-28 NOTE — PLAN OF CARE
Meghan, [facility], [address]  Phone: [phone]   Fax: [phone]                                                       Physical Therapy Certification    Dear Referring Practitioner: Justin Loya PA-C,    We had the pleasure of evaluating the following patient for physical therapy services at [address] [place] [address]. A summary of our findings can be found in the initial assessment below. This includes our plan of care. If you have any questions or concerns regarding these findings, please do not hesitate to contact me at the office phone number checked above. Thank you for the referral.       Physician Signature:_______________________________Date:__________________  By signing above (or electronic signature), therapists plan is approved by physician      Patient: Charo Eason   : 1965   MRN: [id number]  Referring Physician: Referring Practitioner: Justin Loya PA-C      Evaluation Date: 2021      Medical Diagnosis Information:  Diagnosis: R41.430 (ICD-10-CM) - Stress fracture of left tibia with routine healing; S83.232 (ICD-10-CM) - Complex tear of medial meniscus of left knee as current injury   Treatment Diagnosis: L knee pain, weakness, decreased ROM; gait abnormality                                         Insurance information: PT Insurance Information: [employer] [place] [employer]+     Precautions/ Contra-indications: L knee pmm, repair of tibial plateau fx dos: 94   Latex Allergy:  [x]NO      []YES  Preferred Language for Healthcare:   [x]English       []Other:    C-SSRS Triggered by Intake questionnaire (Past 2 wk assessment ):   [x] No, Questionnaire did not trigger screening.   [] Yes, Patient intake triggered C-SSRS Screening     [] Completed, no further action required.    [] Completed, PCP notified via Epic    SUBJECTIVE: Patient stated complaint: L knee pain for about 1 year without specific incident that continued to get worse until the pain became constant; tried injections with minimal relief; pt. Had knee scope 4/1/21 with pmm and repair of tibial plateau fx; pt. Was on crutches for a couple of days following surgery; currently pt. Continues to have swelling and difficulty walking and stairs (performs in step to pattern); reports loss of strength and motion in L LE; denies sleep disturbances; pt. Notes that she is limited to being on her feet standing or walking 5-10 minutes; difficulty walking on uneven surface; occasional \"catching\" in knee    Relevant Medical History: HTN, anxiety  Functional Outcome: LEFS: raw score = 33; dysfunction = 59%    Pain Scale: 0-8/10  Easing factors: ice  Provocative factors: standing, walking, stairs    Type: []Constant   [x]Intermittent  []Radiating [x]Localized - anterior/medial knee []other:     Numbness/Tingling: denies    Occupation/School: works from home - [employer]   Pt.  Notes that she is moving to [place] in 2 months    Living Status/Prior Level of Function:Prior to this injury / incident, pt was independent with ADLs and IADLs, sand volleyball, walking, stairs      OBJECTIVE:   Palpation: no point tenderness noted    Functional Mobility/Transfers: independent    Posture: mild guarding of L knee, weight shift to R    Bandages/Dressings/Incisions: incisions closed and well healed, negative for signs and symptoms of infection    Gait: (include devices/WB status) FWB without AD, antalgic gait with extensor lag    Dermatomes Normal Abnormal Comments   inguinal area (L1)  x     anterior mid-thigh (L2) x     distal ant thigh/med knee (L3) x     medial lower leg and foot (L4) x     lateral lower leg and foot (L5) x     posterior calf (S1) x     medial calcaneus (S2) x          PROM AROM    L R L R   Knee Flexion   100 134   Knee Extension   -3 0       Strength (0-5) / Myotomes Left Right   Hip Flexion - seated (L1-2) 4- 4+   Hip Abduction 4- 4   Quads (L2-4) 4- pain 4+   Hamstrings 4+ 4+        Flexibility     Hamstrings (90/90) -35 -15        Girth (cm)     Mid patella 49 43   Suprapatellar 52 46.5       Joint mobility: patellofemoral   [x]Normal    []Hypo   []Hyper    Balance: NT                         [x] Patient history, allergies, meds reviewed. Medical chart reviewed. See intake form. Review Of Systems (ROS):  [x]Performed Review of systems (Integumentary, CardioPulmonary, Neurological) by intake and observation. Intake form has been scanned into medical record. Patient has been instructed to contact their primary care physician regarding ROS issues if not already being addressed at this time.       Co-morbidities/Complexities (which will affect course of rehabilitation):   []None        []Hx of COVID   Arthritic conditions   []Rheumatoid arthritis (M05.9)  []Osteoarthritis (M19.91)  []Gout   Cardiovascular conditions   [x]Hypertension (I10)  []Hyperlipidemia (E78.5)  []Angina pectoris (I20)  []Atherosclerosis (I70)  []Pacemaker  []Hx of CABG/stent/  cardiac surgeries   Musculoskeletal conditions   []Disc pathology   []Congenital spine pathologies   []Osteoporosis (M81.8)  []Osteopenia (M85.8)  []Scoliosis       Endocrine conditions   []Hypothyroid (E03.9)  []Hyperthyroid Gastrointestinal conditions   []Constipation (O37.38)   Metabolic conditions   []Morbid obesity (E66.01)  []Diabetes type 1(E10.65) or 2 (E11.65)   []Neuropathy (G60.9)     Cardio/Pulmonary conditions   []Asthma (J45)  []Coughing   []COPD (J44.9)  []CHF  []A-fib   Psychological Disorders  [x]Anxiety (F41.9)  []Depression (F32.9)   []Other:   Developmental Disorders  []Autism (F84.0)  []CP (G80)  []Down Syndrome (Q90.9)  []Developmental delay     Neurological conditions  []Prior Stroke (I69.30)  []Parkinson's (G20)  []Encephalopathy (G93.40)  []MS (G35)  []Post-polio (G14)  []SCI  []TBI  []ALS Other conditions  []Fibromyalgia (M79.7)  []Vertigo  []Syncope  []Kidney Failure  []Cancer      []currently undergoing                treatment  []Pregnancy  []Incontinence   Prior surgeries  []involved limb  []previous spinal surgery  [] section birth  []hysterectomy  []bowel / bladder surgery  []other relevant surgeries   []Other:              Barriers to/and or personal factors that will affect rehab potential:              []Age  []Sex    []Smoker              []Motivation/Lack of Motivation                        [x]Co-Morbidities              []Cognitive Function, education/learning barriers              []Environmental, home barriers              []profession/work barriers  []past PT/medical experience  []other:  Justification:     Falls Risk Assessment (30 days):   [x] Falls Risk assessed and no intervention required.   [] Falls Risk assessed and Patient requires intervention due to being higher risk   TUG score (>12s at risk):     [] Falls education provided, including        ASSESSMENT:   Functional Impairments:     []Noted lumbar/proximal hip/LE joint hypomobility   [x]Decreased LE functional ROM   [x]Decreased core/proximal hip strength and neuromuscular control   [x]Decreased LE functional strength   [x]Reduced balance/proprioceptive control   []other:      Functional Activity Limitations (from functional questionnaire and intake)   []Reduced ability to tolerate prolonged functional positions   [x]Reduced ability or difficulty with changes of positions or transfers between positions   [x]Reduced ability to maintain good posture and demonstrate good body mechanics with sitting, bending, and lifting   []Reduced ability to sleep   [] Reduced ability or tolerance with driving and/or computer work   [x]Reduced ability to perform lifting, carrying tasks   [x]Reduced ability to squat   [x]Reduced ability to forward bend   [x]Reduced ability to ambulate prolonged functional periods/distances/surfaces   [x]Reduced ability to ascend/descend stairs   [x]Reduced ability to run, hop, cut or jump   []other:    Participation Restrictions   [x]Reduced participation in self care activities   [x]Reduced participation in home management activities   []Reduced participation in work activities   [x]Reduced participation in social activities. [x]Reduced participation in sport/recreation activities. Classification :    [x]Signs/symptoms consistent with post-surgical status including decreased ROM, strength and function.    []Signs/symptoms consistent with joint sprain/strain   []Signs/symptoms consistent with patella-femoral syndrome   []Signs/symptoms consistent with knee OA/hip OA   []Signs/symptoms consistent with internal derangement of knee/Hip   []Signs/symptoms consistent with functional hip weakness/NMR control      []Signs/symptoms consistent with tendinitis/tendinosis    []signs/symptoms consistent with pathology which may benefit from Dry needling      []other:      Prognosis/Rehab Potential:      []Excellent   [x]Good    []Fair   []Poor    Tolerance of evaluation/treatment:    []Excellent   [x]Good    []Fair   []Poor    Physical Therapy Evaluation Complexity Justification  [x] A history of present problem with:  [] no personal factors and/or comorbidities that impact the plan of care;  [x]1-2 personal factors and/or comorbidities that impact the plan of care  []3 personal factors and/or comorbidities that impact the plan of care  [x] An examination of body systems using standardized tests and measures addressing any of the following: body structures and functions (impairments), activity limitations, and/or participation restrictions;:  [] a total of 1-2 or more elements   [] a total of 3 or more elements   [x] a total of 4 or more elements   [x] A clinical presentation with:  [x] stable and/or uncomplicated characteristics   [] evolving clinical presentation with changing characteristics  [] unstable and unpredictable characteristics; [x] Clinical decision making of [x] Low, [] moderate, [] high complexity using standardized patient assessment instrument and/or measurable assessment of functional outcome. [x] EVAL (LOW) 18119 (typically 15 minutes face-to-face)  [] EVAL (MOD) 28787 (typically 30 minutes face-to-face)  [] EVAL (HIGH) 59332 (typically 45 minutes face-to-face)  [] RE-EVAL     PLAN:   Frequency/Duration:  1-2 days per week for 8-10 Weeks:  Interventions:  [x]  Therapeutic exercise including: strength training, ROM, for Lower extremity and core   [x]  NMR activation and proprioception for LE, Glutes and Core   [x]  Manual therapy as indicated for LE, Hip and spine to include: Dry Needling/IASTM, STM, PROM, Gr I-IV mobilizations, manipulation. [x] Modalities as needed that may include: thermal agents, E-stim, Biofeedback, US, iontophoresis as indicated  [x] Patient education on joint protection, postural re-education, activity modification, progression of HEP. HEP instruction: Written HEP instructions provided and reviewed. GOALS:  Patient stated goal: return to playing volleyball  [] Progressing: [] Met: [] Not Met: [] Adjusted    Therapist goals for Patient:   Short Term Goals: To be achieved in: 2 weeks  1. Independent in HEP and progression per patient tolerance, in order to prevent re-injury. [] Progressing: [] Met: [] Not Met: [] Adjusted  2. Patient will have a decrease in pain to facilitate improvement in movement, function, and ADLs as indicated by Functional Deficits. [] Progressing: [] Met: [] Not Met: [] Adjusted    Long Term Goals: To be achieved in: 8-10 weeks  1. Disability index score of 25% or less for the LEFS to assist with reaching prior level of function. [] Progressing: [] Met: [] Not Met: [] Adjusted  2. Patient will demonstrate increased AROM to 0-125 L knee extension/flexion to allow for proper joint functioning as indicated by patients Functional Deficits.    [] Progressing: [] Met: [] Not Met: [] Adjusted  3. Patient will demonstrate an increase in Strength to at least 4+/ throughout without pain as well as good proximal hip strength and control to allow for proper functional mobility as indicated by patients Functional Deficits. [] Progressing: [] Met: [] Not Met: [] Adjusted  4. Patient will return to functional activities including reciprocal stairs without increased symptoms or restriction. [] Progressing: [] Met: [] Not Met: [] Adjusted  5. Patient will return to playing sand volleyball without increased symptoms.    [] Progressing: [] Met: [] Not Met: [] Adjusted     Electronically signed by:  Cynthia Olson PT

## 2021-04-28 NOTE — FLOWSHEET NOTE
Bailey Christianson  Phone: [phone]   Fax: [phone]    Physical Therapy Treatment Note/ Progress Report:     Date:  2021    Patient Name:  Dagmar Loya    :  1965  MRN: [id number]  Restrictions/Precautions:    Medical/Treatment Diagnosis Information:  Diagnosis: M84.362 (ICD-10-CM) - Stress fracture of left tibia with routine healing; S83.232 (ICD-10-CM) - Complex tear of medial meniscus of left knee as current injury  Treatment Diagnosis: L knee pain, weakness, decreased ROM; gait abnormality  Insurance/Certification information:  PT Insurance Information: Medical [place] [employer]+  Physician Information:  Referring Practitioner: Casimiro Castillo PA-C  Plan of care signed (Y/N): []  Yes [x]  No     Date of Patient follow up with Physician:      Progress Report: []  Yes  [x]  No     Date Range for reporting period:  Beginnin21  Ending:     Progress report due (10 Rx/or 30 days whichever is less): visit #10 or  (date)     Recertification due (POC duration/ or 90 days whichever is less): visit #20 or 21 (date)     Visit # Insurance Allowable Auth required?  Date Range   1 [place] []  Yes  [x]  No n/a     Latex Allergy:  [x]NO      []YES  Preferred Language for Healthcare:   [x]English       []other:    Functional Scale:        Date assessed:  LEFS: raw score = 33; dysfunction = 59%   21    Pain level:  0-8/10     SUBJECTIVE:  See eval    OBJECTIVE:   Girth (cm) L - pre vaso / post vaso R - pre-vaso / post-vaso   Mid-patellar 49 43   Suprapatellar 52 46.5         RESTRICTIONS/PRECAUTIONS: L knee pmm, repair of tibial plateau fx dos:      Exercises/Interventions:     Therapeutic Exercise (57956)  Resistance / level Sets/sec Reps Notes / Cues   bike npv      IB gastroc stretch  30\" 3    Seated HS stretch  30\" 3    Heel slides  5\" 10    Prone quad stretch npv             Quad sets  5\" 10    SLR - flexion  2 10    SLR - abduction  2 10           Therapeutic Activities (88638)                                   Neuromuscular Re-ed (01994)                            Manual Intervention ([id number])       Knee mobs/PROM       Tib/Fem Mobs       Patella Mobs       Ankle mobs                         Modalities:     Pt. Education:  -pt educated on diagnosis, prognosis and expectations for rehab  -all pt questions were answered    Home Exercise Program:  Access Code: [email or web address]  URL: [email or web address]  Date: 04/28/2021  Prepared by: Raisa Boston    Exercises  Seated Table Hamstring Stretch - 1 x daily - 7 x weekly - 3 reps - 30s hold  Gastroc Stretch on Step - 1 x daily - 7 x weekly - 3 reps - 30s hold  Sitting Heel Slide with Towel - 1 x daily - 7 x weekly - 10 reps - 5s hold  Quad Set - 1 x daily - 7 x weekly - 10 reps - 5s hold  Supine Straight Leg Raises - 1 x daily - 7 x weekly - 2 sets - 10 reps  Sidelying Hip Abduction - 1 x daily - 7 x weekly - 2 sets - 10 reps      Therapeutic Exercise and NMR EXR  [x] (04478) Provided verbal/tactile cueing for activities related to strengthening, flexibility, endurance, ROM for improvements in LE, proximal hip, and core control with self care, mobility, lifting, ambulation.  [] (83333) Provided verbal/tactile cueing for activities related to improving balance, coordination, kinesthetic sense, posture, motor skill, proprioception  to assist with LE, proximal hip, and core control in self care, mobility, lifting, ambulation and eccentric single leg control.   [] (83807) Therapist is in constant attendance of 2 or more patients providing skilled therapy interventions, but not providing any significant amount of measurable one-on-one time to either patient, for improvements in LE, proximal hip, and core control in self care, mobility, lifting, ambulation and eccentric single leg control.      NMR and Therapeutic Activities:    [] (53625 or ) Provided verbal/tactile cueing for activities related to improving balance, coordination, kinesthetic sense, posture, motor skill, proprioception and motor activation to allow for proper function of core, proximal hip and LE with self care and ADLs  [] (59282) Gait Re-education- Provided training and instruction to the patient for proper LE, core and proximal hip recruitment and positioning and eccentric body weight control with ambulation re-education including up and down stairs     Home Exercise Program:    [x] (98959) Reviewed/Progressed HEP activities related to strengthening, flexibility, endurance, ROM of core, proximal hip and LE for functional self-care, mobility, lifting and ambulation/stair navigation   [] (80724)Reviewed/Progressed HEP activities related to improving balance, coordination, kinesthetic sense, posture, motor skill, proprioception of core, proximal hip and LE for self care, mobility, lifting, and ambulation/stair navigation      Manual Treatments:  PROM / STM / Oscillations-Mobs:  G-I, II, III, IV (PA's, Inf., Post.)  [x] (60236) Provided manual therapy to mobilize LE, proximal hip and/or LS spine soft tissue/joints for the purpose of modulating pain, promoting relaxation,  increasing ROM, reducing/eliminating soft tissue swelling/inflammation/restriction, improving soft tissue extensibility and allowing for proper ROM for normal function with self care, mobility, lifting and ambulation. Modalities:  [x] (98631) Vasopneumatic compression: Utilized vasopneumatic compression to decrease edema / swelling for the purpose of improving mobility and quad tone / recruitment which will allow for increased overall function including but not limited to self-care, transfers, ambulation, and ascending / descending stairs.        Charges:  Timed Code Treatment Minutes: 25   Total Treatment Minutes: 60     [x] EVAL - LOW (84617)   [] EVAL - MOD (68496)  [] EVAL - HIGH (79988)  [] RE-EVAL (16427)  [x] RD(54225) x 2      [] Ionto  [] NMR (68079) x [x] Vaso  [] Manual (51846) x       [] Ultrasound  [] TA x        [] Mech Traction (08077)  [] Aquatic Therapy x      [] ES (un) (94741):   [] Home Management Training x  [] ES(attended) (56632)   [] Dry Needling 1-2 muscles (74941):  [] Dry Needling 3+ muscles (344343  [] Group:      [] Other:     GOALS:  Patient stated goal: return to playing volleyball  [] Progressing: [] Met: [] Not Met: [] Adjusted    Therapist goals for Patient:   Short Term Goals: To be achieved in: 2 weeks  1. Independent in HEP and progression per patient tolerance, in order to prevent re-injury. [] Progressing: [] Met: [] Not Met: [] Adjusted  2. Patient will have a decrease in pain to facilitate improvement in movement, function, and ADLs as indicated by Functional Deficits. [] Progressing: [] Met: [] Not Met: [] Adjusted    Long Term Goals: To be achieved in: 8-10 weeks  1. Disability index score of 25% or less for the LEFS to assist with reaching prior level of function. [] Progressing: [] Met: [] Not Met: [] Adjusted  2. Patient will demonstrate increased AROM to 0-125 L knee extension/flexion to allow for proper joint functioning as indicated by patients Functional Deficits. [] Progressing: [] Met: [] Not Met: [] Adjusted  3. Patient will demonstrate an increase in Strength to at least 4+/ throughout without pain as well as good proximal hip strength and control to allow for proper functional mobility as indicated by patients Functional Deficits. [] Progressing: [] Met: [] Not Met: [] Adjusted  4. Patient will return to functional activities including reciprocal stairs without increased symptoms or restriction. [] Progressing: [] Met: [] Not Met: [] Adjusted  5. Patient will return to playing sand volleyball without increased symptoms.    [] Progressing: [] Met: [] Not Met: [] Adjusted    Overall Progression Towards Functional goals/ Treatment Progress Update:  [] Patient is progressing as expected towards functional goals listed. [] Progression is slowed due to complexities/Impairments listed. [] Progression has been slowed due to co-morbidities. [x] Plan just implemented, too soon to assess goals progression <30days   [] Goals require adjustment due to lack of progress  [] Patient is not progressing as expected and requires additional follow up with physician  [] Other    Persisting Functional Limitations/Impairments:  []Sitting [x]Standing   [x]Walking [x]Stairs   []Transfers [x]ADLs   [x]Squatting/bending [x]Kneeling  [x]Housework []Job related tasks  []Driving [x]Sports/Recreation   []Sleeping []Other:    ASSESSMENT:  See eval  Treatment/Activity Tolerance:  [x] Pt able to complete treatment [] Patient limited by fatique  [] Patient limited by pain  [] Patient limited by other medical complications  [] Other:     Prognosis:  [x] Good [] Fair  [] Poor    Patient Requires Follow-up: [x] Yes  [] No    Return to Play:    [x]  N/A   []  Stage 1: Intro to Strength   []  Stage 2: Return to Run and Strength   []  Stage 3: Return to Jump and Strength   []  Stage 4: Dynamic Strength and Agility   []  Stage 5: Sport Specific Training     []  Ready to Return to Play, Meets All Above Stages   []  Not Ready for Return to Sports   Comments:            PLAN: See eval. PT 1-2x / week for 8-10 weeks. [] Continue per plan of care [] Alter current plan (see comments)  [x] Plan of care initiated [] Hold pending MD visit [] Discharge    Electronically signed by: Scotty Torres PT, DPT      Note: If patient does not return for scheduled/ recommended follow up visits, this note will serve as a discharge from care along with most recent update on progress.

## 2021-04-29 ENCOUNTER — TELEPHONE (OUTPATIENT)
Dept: ORTHOPEDIC SURGERY | Age: 56
End: 2021-04-29

## 2021-04-29 NOTE — TELEPHONE ENCOUNTER
FAXED BETH / [facility] See ( [facility] ) 04/01/2021 OP REPORT TO TAD Grossman, RN @ [facility] @ [phone]

## 2021-04-30 ENCOUNTER — HOSPITAL ENCOUNTER (OUTPATIENT)
Dept: PHYSICAL THERAPY | Age: 56
Setting detail: THERAPIES SERIES
Discharge: HOME | End: 2021-04-30
Payer: COMMERCIAL

## 2021-04-30 PROCEDURE — 97530 THERAPEUTIC ACTIVITIES: CPT | Performed by: SPECIALIST/TECHNOLOGIST

## 2021-04-30 PROCEDURE — 97016 VASOPNEUMATIC DEVICE THERAPY: CPT | Performed by: SPECIALIST/TECHNOLOGIST

## 2021-04-30 PROCEDURE — 97110 THERAPEUTIC EXERCISES: CPT | Performed by: SPECIALIST/TECHNOLOGIST

## 2021-04-30 NOTE — FLOWSHEET NOTE
Ba[facility] Christianson  Phone: [phone]   Fax: [phone]    Physical Therapy Treatment Note/ Progress Report:     Date:  2021    Patient Name:  Manuelito Juarez    :  1965  MRN: [id number]  Restrictions/Precautions:    Medical/Treatment Diagnosis Information:  Diagnosis: M84.362 (ICD-10-CM) - Stress fracture of left tibia with routine healing; S83.232 (ICD-10-CM) - Complex tear of medial meniscus of left knee as current injury  Treatment Diagnosis: L knee pain, weakness, decreased ROM; gait abnormality  Insurance/Certification information:  PT Insurance Information: Medical [place] Choice+  Physician Information:  Referring Practitioner: Rizwan Drake PA-C  Plan of care signed (Y/N): []  Yes [x]  No     Date of Patient follow up with Physician:      Progress Report: []  Yes  [x]  No     Date Range for reporting period:  Beginnin21  Ending:     Progress report due (10 Rx/or 30 days whichever is less): visit #10 or 91 (date)     Recertification due (POC duration/ or 90 days whichever is less): visit #20 or 21 (date)     Visit # Insurance Allowable Auth required? Date Range   2 [place] []  Yes  [x]  No n/a     Latex Allergy:  [x]NO      []YES  Preferred Language for Healthcare:   [x]English       []other:    Functional Scale:        Date assessed:  LEFS: raw score = 33; dysfunction = 59%   21    Pain level:  2-3/10 5/ 10 worst    SUBJECTIVE:  Left felt pretty good after last session, denies any pain etc. Admits that she can stand or sit with job demands. Wears her compression sock most of the time to control swelling.    OBJECTIVE:   Girth (cm) L - pre vaso / post vaso R - pre-vaso / post-vaso   Mid-patellar 49 43   Suprapatellar 52 46.5     21 Left knee mild antalgia present with increased TTP over infrapatellar knee, peripatellar swelling present  ROM 0-110 w/ sheetpull    RESTRICTIONS/PRECAUTIONS: L knee pmm, repair (calcium phosphate)  of tibial plateau fx  dos: 0/3/48     Exercises/Interventions:     Therapeutic Exercise (15650)  Resistance / level Sets/sec Reps Notes / Cues   bike 5'    upright   IB gastroc stretch  30\" 3x    Seated HS stretch  30\" 3    Heel slides  5\" 10x    Prone quad stretch       SAQ  HOLD 2 10x     Quad sets  5\" 10    SLR - flexion  2 10    SLR - abduction   HL clams    Blue 2 10xea    Added 4/30   LP  B 50# 2 10x Added 4/30          Hip Abd  Holding onto table Blue 1 15x    Therapeutic Activities (34774)                                   Neuromuscular Re-ed (52822)       Tandem stance  15\" 3x aeromat   Added 4/30                 Manual Intervention (28157)       Knee mobs/PROM       Tib/Fem Mobs       Patella Mobs       Ankle mobs                         Modalities:     Pt. Education:  -pt educated on diagnosis, prognosis and expectations for rehab  -all pt questions were answered    Home Exercise Program:  Access Code: [email or web address]  URL: [email or web address]  Date: 04/28/2021  Prepared by: Marquis Toribio    Exercises  Seated Table Hamstring Stretch - 1 x daily - 7 x weekly - 3 reps - 30s hold  Gastroc Stretch on Step - 1 x daily - 7 x weekly - 3 reps - 30s hold  Sitting Heel Slide with Towel - 1 x daily - 7 x weekly - 10 reps - 5s hold  Quad Set - 1 x daily - 7 x weekly - 10 reps - 5s hold  Supine Straight Leg Raises - 1 x daily - 7 x weekly - 2 sets - 10 reps  Sidelying Hip Abduction - 1 x daily - 7 x weekly - 2 sets - 10 reps      Therapeutic Exercise and NMR EXR  [x] (65108) Provided verbal/tactile cueing for activities related to strengthening, flexibility, endurance, ROM for improvements in LE, proximal hip, and core control with self care, mobility, lifting, ambulation.  [] (28901) Provided verbal/tactile cueing for activities related to improving balance, coordination, kinesthetic sense, posture, motor skill, proprioception  to assist with LE, proximal hip, and core control in self care, patients Functional Deficits. [] Progressing: [] Met: [] Not Met: [] Adjusted  4. Patient will return to functional activities including reciprocal stairs without increased symptoms or restriction. [] Progressing: [] Met: [] Not Met: [] Adjusted  5. Patient will return to playing sand volleyball without increased symptoms. [] Progressing: [] Met: [] Not Met: [] Adjusted    Overall Progression Towards Functional goals/ Treatment Progress Update:  [] Patient is progressing as expected towards functional goals listed. [] Progression is slowed due to complexities/Impairments listed. [] Progression has been slowed due to co-morbidities. [x] Plan just implemented, too soon to assess goals progression <30days   [] Goals require adjustment due to lack of progress  [] Patient is not progressing as expected and requires additional follow up with physician  [] Other    Persisting Functional Limitations/Impairments:  []Sitting [x]Standing   [x]Walking [x]Stairs   []Transfers [x]ADLs   [x]Squatting/bending [x]Kneeling  [x]Housework []Job related tasks  []Driving [x]Sports/Recreation   []Sleeping []Other:    ASSESSMENT:  Pt denied pain with treatment today but did have some pressure performing LP today. Pt has most discomfort under the patella with walking etc. Pt program challenged with progression of wts with SL abduction, HL clams, LP and standing hip abduction. Pt had some increase in fatigue with glute medius muscles groups but overall felt pretty good. Good progression in Left knee ROM today.    Treatment/Activity Tolerance:  [x] Pt able to complete treatment [x] Patient limited by fatique  [] Patient limited by pain  [] Patient limited by other medical complications  [] Other:     Prognosis:  [x] Good [] Fair  [] Poor    Patient Requires Follow-up: [x] Yes  [] No    Return to Play:    [x]  N/A   []  Stage 1: Intro to Strength   []  Stage 2: Return to Run and Strength   []  Stage 3: Return to Jump and

## 2021-05-05 ENCOUNTER — HOSPITAL ENCOUNTER (OUTPATIENT)
Dept: PHYSICAL THERAPY | Age: 56
Setting detail: THERAPIES SERIES
Discharge: HOME | End: 2021-05-05
Payer: COMMERCIAL

## 2021-05-05 PROCEDURE — 97112 NEUROMUSCULAR REEDUCATION: CPT | Performed by: SPECIALIST/TECHNOLOGIST

## 2021-05-05 PROCEDURE — 97110 THERAPEUTIC EXERCISES: CPT | Performed by: SPECIALIST/TECHNOLOGIST

## 2021-05-05 PROCEDURE — 97140 MANUAL THERAPY 1/> REGIONS: CPT | Performed by: SPECIALIST/TECHNOLOGIST

## 2021-05-05 NOTE — FLOWSHEET NOTE
Bailey Christianson  Phone: [phone]   Fax: [phone]    Physical Therapy Treatment Note/ Progress Report:     Date:  2021    Patient Name:  Jose Monterroso    :  1965  MRN: [id number]  Restrictions/Precautions:    Medical/Treatment Diagnosis Information:  Diagnosis: M84.362 (ICD-10-CM) - Stress fracture of left tibia with routine healing; S83.232 (ICD-10-CM) - Complex tear of medial meniscus of left knee as current injury  Treatment Diagnosis: L knee pain, weakness, decreased ROM; gait abnormality  Insurance/Certification information:  PT Insurance Information: Medical [place] Choice+  Physician Information:  Referring Practitioner: Alfonzo Benavides PA-C  Plan of care signed (Y/N): []  Yes [x]  No     Date of Patient follow up with Physician:      Progress Report: []  Yes  [x]  No     Date Range for reporting period:  Beginnin21  Ending:     Progress report due (10 Rx/or 30 days whichever is less): visit #10 or  (date)     Recertification due (POC duration/ or 90 days whichever is less): visit #20 or 21 (date)     Visit # Insurance Allowable Auth required? Date Range   3 [place] []  Yes  [x]  No n/a     Latex Allergy:  [x]NO      []YES  Preferred Language for Healthcare:   [x]English       []other:    Functional Scale:        Date assessed:  LEFS: raw score = 33; dysfunction = 59%   21    Pain level:  1-2/10 5/ 10 worst    SUBJECTIVE:  Pt reports swelling better in the am hours but with progression of the day from being sedentary, it worsens.      OBJECTIVE:   Girth (cm) L - pre vaso / post vaso R - pre-vaso / post-vaso   Mid-patellar 49 43   Suprapatellar 52 46.5     21 Left knee mild antalgia present with increased TTP over infrapatellar knee, peripatellar swelling present  ROM 0-110 w/ sheetpull    RESTRICTIONS/PRECAUTIONS: L knee pmm, repair (calcium phosphate)  of tibial plateau fx  dos: 79    RT knee has crepitice w quad isometrics 5/5    Exercises/Interventions:     Therapeutic Exercise (47173)  Resistance / level Sets/sec Reps Notes / Cues   bike 5'    upright   IB gastroc stretch  30\" 3x    Seated HS stretch  30\" 3    Heel slides  5\" 10x    Prone quad stretch       SAQ  Decreased ROM 2 10x  2\"   Quad sets  5\" 10    SLR - flexion seated  2 10    SLR - abduction   SL clams 2#   Blue 2 10xea    Added 4/30   LP  B  SL 60#  35# 2 10x Added 4/30  Added SL 5/5    Standing ham curls 2# 2 10x    Quad Isometrics setting  F left  10\" 10x Added 5/5   Hamstrings ECC 25# 2 10x Added 5/5   Hip Abd   2# 2 10x Added 5/5   Therapeutic Activities (14784)                                   Neuromuscular Re-ed (57635)       Tandem stance aerex 15\" 3x aeromat   Added 4/30   4\" stairs    trialed  6\"  With mild antalgia descending Up/over   x15 5/5          Manual Intervention (67942)       Knee mobs/PROM       Tib/Fem Mobs       Patella Mobs       Ankle mobs       STM portals    8'  all  Portals with increased emphasis on Medial compartment              Modalities:     Pt. Education:  -pt educated on diagnosis, prognosis and expectations for rehab  -all pt questions were answered    Home Exercise Program:  Access Code: [email or web address]  URL: [email or web address]  Date: 04/28/2021  Prepared by: Nadege Edwards    Exercises  Seated Table Hamstring Stretch - 1 x daily - 7 x weekly - 3 reps - 30s hold  Gastroc Stretch on Step - 1 x daily - 7 x weekly - 3 reps - 30s hold  Sitting Heel Slide with Towel - 1 x daily - 7 x weekly - 10 reps - 5s hold  Quad Set - 1 x daily - 7 x weekly - 10 reps - 5s hold  Supine Straight Leg Raises - 1 x daily - 7 x weekly - 2 sets - 10 reps  Sidelying Hip Abduction - 1 x daily - 7 x weekly - 2 sets - 10 reps      Therapeutic Exercise and NMR EXR  [x] (24018) Provided verbal/tactile cueing for activities related to strengthening, flexibility, endurance, ROM for improvements in LE, proximal hip, and core control with self care, mobility, lifting, ambulation.  [] (26383) Provided verbal/tactile cueing for activities related to improving balance, coordination, kinesthetic sense, posture, motor skill, proprioception  to assist with LE, proximal hip, and core control in self care, mobility, lifting, ambulation and eccentric single leg control.   [] (16496) Therapist is in constant attendance of 2 or more patients providing skilled therapy interventions, but not providing any significant amount of measurable one-on-one time to either patient, for improvements in LE, proximal hip, and core control in self care, mobility, lifting, ambulation and eccentric single leg control.      NMR and Therapeutic Activities:    [] (63292 or 84139) Provided verbal/tactile cueing for activities related to improving balance, coordination, kinesthetic sense, posture, motor skill, proprioception and motor activation to allow for proper function of core, proximal hip and LE with self care and ADLs  [] (34717) Gait Re-education- Provided training and instruction to the patient for proper LE, core and proximal hip recruitment and positioning and eccentric body weight control with ambulation re-education including up and down stairs     Home Exercise Program:    [x] (39598) Reviewed/Progressed HEP activities related to strengthening, flexibility, endurance, ROM of core, proximal hip and LE for functional self-care, mobility, lifting and ambulation/stair navigation   [] (75099)Reviewed/Progressed HEP activities related to improving balance, coordination, kinesthetic sense, posture, motor skill, proprioception of core, proximal hip and LE for self care, mobility, lifting, and ambulation/stair navigation      Manual Treatments:  PROM / STM / Oscillations-Mobs:  G-I, II, III, IV (PA's, Inf., Post.)  [x] (61621) Provided manual therapy to mobilize LE, proximal hip and/or LS spine soft tissue/joints for the purpose of modulating pain, promoting relaxation, increasing ROM, reducing/eliminating soft tissue swelling/inflammation/restriction, improving soft tissue extensibility and allowing for proper ROM for normal function with self care, mobility, lifting and ambulation. Modalities:  [x] (99424) Vasopneumatic compression: Utilized vasopneumatic compression to decrease edema / swelling for the purpose of improving mobility and quad tone / recruitment which will allow for increased overall function including but not limited to self-care, transfers, ambulation, and ascending / descending stairs. Charges:  Timed Code Treatment Minutes: 45   Total Treatment Minutes: 60     [] EVAL - LOW (80920)   [] EVAL - MOD (09391)  [] EVAL - HIGH (05197)  [] RE-EVAL (25290)  [x] XX(47719) x 1      [] Ionto  [x] NMR (64810) x   1    [x] Vaso  [x] Manual (35602) x 1      [] Ultrasound  [] TA x        [] Mech Traction (67561)  [] Aquatic Therapy x      [] ES (un) (46076):   [] Home Management Training x  [] ES(attended) (39136)   [] Dry Needling 1-2 muscles (58319):  [] Dry Needling 3+ muscles (765771  [] Group:      [] Other:     GOALS:  Patient stated goal: return to playing volleyball  [] Progressing: [] Met: [] Not Met: [] Adjusted    Therapist goals for Patient:   Short Term Goals: To be achieved in: 2 weeks  1. Independent in HEP and progression per patient tolerance, in order to prevent re-injury. [] Progressing: [] Met: [] Not Met: [] Adjusted  2. Patient will have a decrease in pain to facilitate improvement in movement, function, and ADLs as indicated by Functional Deficits. [] Progressing: [] Met: [] Not Met: [] Adjusted    Long Term Goals: To be achieved in: 8-10 weeks  1. Disability index score of 25% or less for the LEFS to assist with reaching prior level of function. [] Progressing: [] Met: [] Not Met: [] Adjusted  2.  Patient will demonstrate increased AROM to 0-125 L knee extension/flexion to allow for proper joint functioning as indicated by patients Functional Deficits. [] Progressing: [] Met: [] Not Met: [] Adjusted  3. Patient will demonstrate an increase in Strength to at least 4+/ throughout without pain as well as good proximal hip strength and control to allow for proper functional mobility as indicated by patients Functional Deficits. [] Progressing: [] Met: [] Not Met: [] Adjusted  4. Patient will return to functional activities including reciprocal stairs without increased symptoms or restriction. [] Progressing: [] Met: [] Not Met: [] Adjusted  5. Patient will return to playing sand volleyball without increased symptoms. [] Progressing: [] Met: [] Not Met: [] Adjusted    Overall Progression Towards Functional goals/ Treatment Progress Update:  [] Patient is progressing as expected towards functional goals listed. [] Progression is slowed due to complexities/Impairments listed. [] Progression has been slowed due to co-morbidities. [x] Plan just implemented, too soon to assess goals progression <30days   [] Goals require adjustment due to lack of progress  [] Patient is not progressing as expected and requires additional follow up with physician  [] Other    Persisting Functional Limitations/Impairments:  []Sitting [x]Standing   [x]Walking [x]Stairs   []Transfers [x]ADLs   [x]Squatting/bending [x]Kneeling  [x]Housework []Job related tasks  []Driving [x]Sports/Recreation   []Sleeping []Other:    ASSESSMENT:  Pt denied pain with treatment today but did have some pressure performing LP today especially with SL. Pt had discomfort under the patella with walking etc. But this much improved today. Pt program challenged with progression of wts with LP/ SLR's and standing hip abduction. Pt performed stairs with fair mechanics reciprocally but with increased height became more challenged with descending. Pt had some increase in fatigue with glute medius muscles groups but overall felt pretty good.  Advised pt to keep knee wrapped when on her feet more due to general peripatellar swelling. Treatment/Activity Tolerance:  [x] Pt able to complete treatment [x] Patient limited by fatique  [] Patient limited by pain  [] Patient limited by other medical complications  [] Other:     Prognosis:  [x] Good [] Fair  [] Poor    Patient Requires Follow-up: [x] Yes  [] No    Return to Play:    [x]  N/A   []  Stage 1: Intro to Strength   []  Stage 2: Return to Run and Strength   []  Stage 3: Return to Jump and Strength   []  Stage 4: Dynamic Strength and Agility   []  Stage 5: Sport Specific Training     []  Ready to Return to Play, Meets All Above Stages   []  Not Ready for Return to Sports   Comments:            PLAN:  PT 1-2x / week for 8-10 weeks. NPV assess pt ROM. [x] Continue per plan of care [] Alter current plan (see comments)  [] Plan of care initiated [] Hold pending MD visit [] Discharge    Electronically signed by: Virginia Nolasco PTA, [id number]      Note: If patient does not return for scheduled/ recommended follow up visits, this note will serve as a discharge from care along with most recent update on progress.

## 2021-05-07 ENCOUNTER — OFFICE VISIT (OUTPATIENT)
Dept: ORTHOPEDIC SURGERY | Age: 56
End: 2021-05-07

## 2021-05-07 VITALS — BODY MASS INDEX: 34.06 KG/M2 | HEIGHT: 67 IN | WEIGHT: 217 LBS

## 2021-05-07 DIAGNOSIS — M84.362D STRESS FRACTURE OF LEFT TIBIA WITH ROUTINE HEALING, SUBSEQUENT ENCOUNTER: ICD-10-CM

## 2021-05-07 DIAGNOSIS — S83.232D COMPLEX TEAR OF MEDIAL MENISCUS OF LEFT KNEE AS CURRENT INJURY, SUBSEQUENT ENCOUNTER: Primary | ICD-10-CM

## 2021-05-07 PROCEDURE — 99024 POSTOP FOLLOW-UP VISIT: CPT | Performed by: PHYSICIAN ASSISTANT

## 2021-05-11 ENCOUNTER — HOSPITAL ENCOUNTER (OUTPATIENT)
Dept: PHYSICAL THERAPY | Age: 56
Setting detail: THERAPIES SERIES
Discharge: HOME | End: 2021-05-11
Payer: COMMERCIAL

## 2021-05-11 PROCEDURE — 97016 VASOPNEUMATIC DEVICE THERAPY: CPT | Performed by: PHYSICAL THERAPIST

## 2021-05-11 PROCEDURE — 97110 THERAPEUTIC EXERCISES: CPT | Performed by: PHYSICAL THERAPIST

## 2021-05-11 PROCEDURE — 97530 THERAPEUTIC ACTIVITIES: CPT | Performed by: PHYSICAL THERAPIST

## 2021-05-11 NOTE — FLOWSHEET NOTE
[facility]  Phone: [phone]   Fax: [phone]    Physical Therapy Treatment Note/ Progress Report:     Date:  2021    Patient Name:  Dagmar Loya    :  1965  MRN: [id number]  Restrictions/Precautions:    Medical/Treatment Diagnosis Information:  Diagnosis: M84.362 (ICD-10-CM) - Stress fracture of left tibia with routine healing; S83.232 (ICD-10-CM) - Complex tear of medial meniscus of left knee as current injury  Treatment Diagnosis: L knee pain, weakness, decreased ROM; gait abnormality  Insurance/Certification information:  PT Insurance Information: Medical [place] Choice+  Physician Information:  Referring Practitioner: Casimiro Castillo PA-C  Plan of care signed (Y/N): [x]  Yes []  No     Date of Patient follow up with Physician:      Progress Report: []  Yes  [x]  No     Date Range for reporting period:  Beginnin21  Ending:     Progress report due (10 Rx/or 30 days whichever is less): visit #10 or  (date)     Recertification due (POC duration/ or 90 days whichever is less): visit #20 or 21 (date)     Visit # Insurance Allowable Auth required? Date Range   4 [place] []  Yes  [x]  No n/a     Latex Allergy:  [x]NO      []YES  Preferred Language for Healthcare:   [x]English       []other:    Functional Scale:        Date assessed:  LEFS: raw score = 33; dysfunction = 59%   21    Pain level:  0/10     SUBJECTIVE:  Pt. Reports that overall she is feeling a lot better. She continues to have pain with increased activity (over 15-20 min). Pt. Continues to have difficulty with stairs and performs in step to pattern.      OBJECTIVE:      L knee ROM: 0-110 w/ heel slides    RESTRICTIONS/PRECAUTIONS: L knee pmm, repair (calcium phosphate)  of tibial plateau fx  dos: 9/3/07    RT knee has crepitice w quad isometrics     Exercises/Interventions:     Therapeutic Exercise (85356)  Resistance / level Sets/sec Reps Notes / Cues   bike 5'    upright   IB gastroc stretch  30\" 3x    Seated HS stretch  30\" 3    Heel slides  5\" 10x    Prone quad stretch  30\" 3    SAQ  Decreased ROM 2 10x  2\"   Quad sets  5\" 10    SLR - flexion - semireclined  2 10    SLR - abduction  SL clams 2#   Blue 2 10xea    Added 4/30   Leg press DL 80#  SL 40# 2 10x Added 4/30  Added SL 5/5   LAQ    5/11 attempted but increased pain   Standing ham curls 4# 2 10x    Quad Isometrics (on leg extension)   -setting  F left  10\" 10x Added 5/5   Hamstrings  SL 25# 2 10x Added 5/5   Added 5/5          Therapeutic Activities (27531)       Sit to stand airex in chair 2 10 For improved transitional movements, cueing for equal WB   Step up and over 4\" 1 15 Tactile facilitation for appropriate weight shift and knee bend                 Neuromuscular Re-ed (05730)       Tandem stance airex 15\" 3x                  Manual Intervention (53354)       Knee mobs/PROM       Tib/Fem Mobs       Patella Mobs       Ankle mobs       STM portals   3'   all incisions              Modalities: vaso - 10' mod pressure    Pt. Education:  -all pt questions were answered    Home Exercise Program:  Access Code: [email or web address]  URL: [email or web address]  Date: 04/28/2021  Prepared by: Jerilyn Rucker    Exercises  Seated Table Hamstring Stretch - 1 x daily - 7 x weekly - 3 reps - 30s hold  Gastroc Stretch on Step - 1 x daily - 7 x weekly - 3 reps - 30s hold  Sitting Heel Slide with Towel - 1 x daily - 7 x weekly - 10 reps - 5s hold  Quad Set - 1 x daily - 7 x weekly - 10 reps - 5s hold  Supine Straight Leg Raises - 1 x daily - 7 x weekly - 2 sets - 10 reps  Sidelying Hip Abduction - 1 x daily - 7 x weekly - 2 sets - 10 reps      Therapeutic Exercise and NMR EXR  [x] (92644) Provided verbal/tactile cueing for activities related to strengthening, flexibility, endurance, ROM for improvements in LE, proximal hip, and core control with self care, mobility, lifting, ambulation.  [] (29381) Provided verbal/tactile cueing for activities related to improving balance, coordination, kinesthetic sense, posture, motor skill, proprioception  to assist with LE, proximal hip, and core control in self care, mobility, lifting, ambulation and eccentric single leg control.   [] (63038) Therapist is in constant attendance of 2 or more patients providing skilled therapy interventions, but not providing any significant amount of measurable one-on-one time to either patient, for improvements in LE, proximal hip, and core control in self care, mobility, lifting, ambulation and eccentric single leg control.      NMR and Therapeutic Activities:    [] (97100 or 06132) Provided verbal/tactile cueing for activities related to improving balance, coordination, kinesthetic sense, posture, motor skill, proprioception and motor activation to allow for proper function of core, proximal hip and LE with self care and ADLs  [] (23997) Gait Re-education- Provided training and instruction to the patient for proper LE, core and proximal hip recruitment and positioning and eccentric body weight control with ambulation re-education including up and down stairs     Home Exercise Program:    [x] (91085) Reviewed/Progressed HEP activities related to strengthening, flexibility, endurance, ROM of core, proximal hip and LE for functional self-care, mobility, lifting and ambulation/stair navigation   [] (12645)Reviewed/Progressed HEP activities related to improving balance, coordination, kinesthetic sense, posture, motor skill, proprioception of core, proximal hip and LE for self care, mobility, lifting, and ambulation/stair navigation      Manual Treatments:  PROM / STM / Oscillations-Mobs:  G-I, II, III, IV (PA's, Inf., Post.)  [x] (01014) Provided manual therapy to mobilize LE, proximal hip and/or LS spine soft tissue/joints for the purpose of modulating pain, promoting relaxation,  increasing ROM, reducing/eliminating soft tissue Met: [] Adjusted  3. Patient will demonstrate an increase in Strength to at least 4+/ throughout without pain as well as good proximal hip strength and control to allow for proper functional mobility as indicated by patients Functional Deficits. [] Progressing: [] Met: [] Not Met: [] Adjusted  4. Patient will return to functional activities including reciprocal stairs without increased symptoms or restriction. [] Progressing: [] Met: [] Not Met: [] Adjusted  5. Patient will return to playing sand volleyball without increased symptoms. [] Progressing: [] Met: [] Not Met: [] Adjusted    Overall Progression Towards Functional goals/ Treatment Progress Update:  [] Patient is progressing as expected towards functional goals listed. [] Progression is slowed due to complexities/Impairments listed. [] Progression has been slowed due to co-morbidities. [x] Plan just implemented, too soon to assess goals progression <30days   [] Goals require adjustment due to lack of progress  [] Patient is not progressing as expected and requires additional follow up with physician  [] Other    Persisting Functional Limitations/Impairments:  []Sitting [x]Standing   [x]Walking [x]Stairs   []Transfers [x]ADLs   [x]Squatting/bending [x]Kneeling  [x]Housework []Job related tasks  []Driving [x]Sports/Recreation   []Sleeping []Other:    ASSESSMENT:  Pt. Tolerated therapy today with minimal complaints. Pt. Continues to have mild deficits in knee flexion ROM. Initiated quad stretch this date to facilitate further knee flexion mobility. Pt. Continues to have pain and crepitus with open chain quad activation exercises, unable to perform LAQ this date due to pain. Able to progress resistance with leg press. Cueing required with sit to stand for equal WB. Tactile cueing required with step up to decrease compensations and weight shift onto involved LE.  Pt. Requires continued progression of skilled therapy in order to restore knee mobility and functional strength to enable pt. To perform reciprocal stairs. Treatment/Activity Tolerance:  [x] Pt able to complete treatment [] Patient limited by fatique  [] Patient limited by pain  [] Patient limited by other medical complications  [] Other:     Prognosis:  [x] Good [] Fair  [] Poor    Patient Requires Follow-up: [x] Yes  [] No    Return to Play:    [x]  N/A   []  Stage 1: Intro to Strength   []  Stage 2: Return to Run and Strength   []  Stage 3: Return to Jump and Strength   []  Stage 4: Dynamic Strength and Agility   []  Stage 5: Sport Specific Training     []  Ready to Return to Play, Meets All Above Stages   []  Not Ready for Return to Sports   Comments:            PLAN:  PT 1-2x / week for 8-10 weeks. [x] Continue per plan of care [] Alter current plan (see comments)  [] Plan of care initiated [] Hold pending MD visit [] Discharge    Electronically signed by: Arturo Fang PT      Note: If patient does not return for scheduled/ recommended follow up visits, this note will serve as a discharge from care along with most recent update on progress.

## 2021-05-13 ENCOUNTER — HOSPITAL ENCOUNTER (OUTPATIENT)
Dept: PHYSICAL THERAPY | Age: 56
Setting detail: THERAPIES SERIES
Discharge: HOME | End: 2021-05-13
Payer: COMMERCIAL

## 2021-05-13 PROCEDURE — 97530 THERAPEUTIC ACTIVITIES: CPT | Performed by: PHYSICAL THERAPIST

## 2021-05-13 PROCEDURE — 97110 THERAPEUTIC EXERCISES: CPT | Performed by: PHYSICAL THERAPIST

## 2021-05-13 NOTE — FLOWSHEET NOTE
Bailey Christianson  Phone: [phone]   Fax: [phone]    Physical Therapy Treatment Note/ Progress Report:     Date:  2021    Patient Name:  Speedy Beck    :  1965  MRN: [id number]  Restrictions/Precautions:    Medical/Treatment Diagnosis Information:  Diagnosis: M84.362 (ICD-10-CM) - Stress fracture of left tibia with routine healing; S83.232 (ICD-10-CM) - Complex tear of medial meniscus of left knee as current injury  Treatment Diagnosis: L knee pain, weakness, decreased ROM; gait abnormality  Insurance/Certification information:  PT Insurance Information: [employer]  Physician Information:  Referring Practitioner: Mary Lee PA-C  Plan of care signed (Y/N): [x]  Yes []  No     Date of Patient follow up with Physician:      Progress Report: []  Yes  [x]  No     Date Range for reporting period:  Beginnin21  Ending:     Progress report due (10 Rx/or 30 days whichever is less): visit #10 or  (date)     Recertification due (POC duration/ or 90 days whichever is less): visit #20 or 21 (date)     Visit # Insurance Allowable Auth required? Date Range   5 [place] []  Yes  [x]  No n/a     Latex Allergy:  [x]NO      []YES  Preferred Language for Healthcare:   [x]English       []other:    Functional Scale:        Date assessed:  LEFS: raw score = 33; dysfunction = 59%   21    Pain level:  0/10     SUBJECTIVE:  Pt. Reports that her knee is feeling about the same. She had a little soreness following previous therapy session that subsided by the next day.      OBJECTIVE:      L knee ROM: 0-117 w/ heel slides    RESTRICTIONS/PRECAUTIONS: L knee pmm, repair (calcium phosphate)  of tibial plateau fx  dos: 6/3/57    RT knee has crepitice w quad isometrics     Exercises/Interventions:     Therapeutic Exercise (99413)  Resistance / level Sets/sec Reps Notes / Cues   bike L2 5'   upright   IB gastroc stretch  30\" 3x    Seated HS stretch  30\" 3    Heel slides  5\" 10x    Prone quad stretch  30\" 3    2\"   Quad sets  5\" 10    SLR - flexion - semireclined  2 10    SLR - abduction  SL clams 4#   Blue 2 10xea    Added 4/30   Leg press DL 90#  SL 50# 2 10x Added 4/30  Added SL 5/5   5/11 attempted but increased pain      Quad Isometrics (on leg extension)   -setting  F left  10\" 10x Added 5/5   Hamstrings  SL 25# 2 10x Added 5/5   Added 5/5          Therapeutic Activities (70935)       Sit to stand airex in chair 2 10 For improved transitional movements, cueing for equal WB   Step up and over 6\" 1 15 Tactile facilitation initially for appropriate weight shift and knee bend   Lateral stepping blue 2 10    TRX retro lunge Limited range 2 10                  Neuromuscular Re-ed (18007)       Tandem stance airex 30\" 2ea                  Manual Intervention (75503)       Knee mobs/PROM       Tib/Fem Mobs       Patella Mobs       Ankle mobs                  Modalities: vaso - 10' mod pressure    Pt. Education:  -all pt questions were answered    Home Exercise Program:  Access Code: [email or web address]  URL: [email or web address]  Date: 04/28/2021  Prepared by: Carmen Martino    Exercises  Seated Table Hamstring Stretch - 1 x daily - 7 x weekly - 3 reps - 30s hold  Gastroc Stretch on Step - 1 x daily - 7 x weekly - 3 reps - 30s hold  Sitting Heel Slide with Towel - 1 x daily - 7 x weekly - 10 reps - 5s hold  Quad Set - 1 x daily - 7 x weekly - 10 reps - 5s hold  Supine Straight Leg Raises - 1 x daily - 7 x weekly - 2 sets - 10 reps  Sidelying Hip Abduction - 1 x daily - 7 x weekly - 2 sets - 10 reps      Therapeutic Exercise and NMR EXR  [x] (75957) Provided verbal/tactile cueing for activities related to strengthening, flexibility, endurance, ROM for improvements in LE, proximal hip, and core control with self care, mobility, lifting, ambulation.  [] (45166) Provided verbal/tactile cueing for activities related to improving balance, coordination, kinesthetic sense, posture, motor skill, proprioception  to assist with LE, proximal hip, and core control in self care, mobility, lifting, ambulation and eccentric single leg control.   [] (51390) Therapist is in constant attendance of 2 or more patients providing skilled therapy interventions, but not providing any significant amount of measurable one-on-one time to either patient, for improvements in LE, proximal hip, and core control in self care, mobility, lifting, ambulation and eccentric single leg control.      NMR and Therapeutic Activities:    [] (44562 or 32194) Provided verbal/tactile cueing for activities related to improving balance, coordination, kinesthetic sense, posture, motor skill, proprioception and motor activation to allow for proper function of core, proximal hip and LE with self care and ADLs  [] (61515) Gait Re-education- Provided training and instruction to the patient for proper LE, core and proximal hip recruitment and positioning and eccentric body weight control with ambulation re-education including up and down stairs     Home Exercise Program:    [x] (35666) Reviewed/Progressed HEP activities related to strengthening, flexibility, endurance, ROM of core, proximal hip and LE for functional self-care, mobility, lifting and ambulation/stair navigation   [] (34903)Reviewed/Progressed HEP activities related to improving balance, coordination, kinesthetic sense, posture, motor skill, proprioception of core, proximal hip and LE for self care, mobility, lifting, and ambulation/stair navigation      Manual Treatments:  PROM / STM / Oscillations-Mobs:  G-I, II, III, IV (PA's, Inf., Post.)  [x] (16635) Provided manual therapy to mobilize LE, proximal hip and/or LS spine soft tissue/joints for the purpose of modulating pain, promoting relaxation,  increasing ROM, reducing/eliminating soft tissue swelling/inflammation/restriction, improving soft tissue extensibility and allowing for proper ROM for normal function with self care, mobility, lifting and ambulation. Modalities:  [x] (63023) Vasopneumatic compression: Utilized vasopneumatic compression to decrease edema / swelling for the purpose of improving mobility and quad tone / recruitment which will allow for increased overall function including but not limited to self-care, transfers, ambulation, and ascending / descending stairs. Charges:  Timed Code Treatment Minutes: 41   Total Treatment Minutes: 41     [] EVAL - LOW (89955)   [] EVAL - MOD (06063)  [] EVAL - HIGH (80088)   [] RE-EVAL (62339)  [x] ZR(34869) x 2      [] Ionto  [] NMR (86051) x       [] Vaso  [] Manual (37385) x       [] Ultrasound  [x] TA x 1        [] Mech Traction (39331)  [] Aquatic Therapy x      [] ES (un) (78823):   [] Home Management Training x  [] ES(attended) (33678)   [] Dry Needling 1-2 muscles (54108):  [] Dry Needling 3+ muscles (608411  [] Group:      [] Other:     GOALS:  Patient stated goal: return to playing volleyball  [] Progressing: [] Met: [] Not Met: [] Adjusted    Therapist goals for Patient:   Short Term Goals: To be achieved in: 2 weeks  1. Independent in HEP and progression per patient tolerance, in order to prevent re-injury. [] Progressing: [] Met: [] Not Met: [] Adjusted  2. Patient will have a decrease in pain to facilitate improvement in movement, function, and ADLs as indicated by Functional Deficits. [] Progressing: [] Met: [] Not Met: [] Adjusted    Long Term Goals: To be achieved in: 8-10 weeks  1. Disability index score of 25% or less for the LEFS to assist with reaching prior level of function. [] Progressing: [] Met: [] Not Met: [] Adjusted  2. Patient will demonstrate increased AROM to 0-125 L knee extension/flexion to allow for proper joint functioning as indicated by patients Functional Deficits. [] Progressing: [] Met: [] Not Met: [] Adjusted  3.  Patient will demonstrate an increase in Strength to at least 4+/ throughout without pain as well as good proximal hip strength and control to allow for proper functional mobility as indicated by patients Functional Deficits. [] Progressing: [] Met: [] Not Met: [] Adjusted  4. Patient will return to functional activities including reciprocal stairs without increased symptoms or restriction. [] Progressing: [] Met: [] Not Met: [] Adjusted  5. Patient will return to playing sand volleyball without increased symptoms. [] Progressing: [] Met: [] Not Met: [] Adjusted    Overall Progression Towards Functional goals/ Treatment Progress Update:  [] Patient is progressing as expected towards functional goals listed. [] Progression is slowed due to complexities/Impairments listed. [] Progression has been slowed due to co-morbidities. [x] Plan just implemented, too soon to assess goals progression <30days   [] Goals require adjustment due to lack of progress  [] Patient is not progressing as expected and requires additional follow up with physician  [] Other    Persisting Functional Limitations/Impairments:  []Sitting [x]Standing   [x]Walking [x]Stairs   []Transfers [x]ADLs   [x]Squatting/bending [x]Kneeling  [x]Housework []Job related tasks  []Driving [x]Sports/Recreation   []Sleeping []Other:    ASSESSMENT:  Pt. Tolerated therapy today without complaints. Pt. Demonstrates slight improvement in knee flexion ROM. Pt. Appropriately challenged by increases in resistance activities. Pt. Demonstrates improved control and technique with step up and over, able to progress step height. Pt. Continues to have some pain with open chain knee extension activities. Difficulty unlocking knee for retro lunge and step down, tactile facilitation required initially. Pt. Will continue to benefit from skilled therapy in order to restore functional strength for community navigation and stairs without pain.      Treatment/Activity Tolerance:  [x] Pt able to complete treatment [] Patient limited by

## 2021-05-18 ENCOUNTER — HOSPITAL ENCOUNTER (OUTPATIENT)
Dept: PHYSICAL THERAPY | Age: 56
Setting detail: THERAPIES SERIES
Discharge: HOME | End: 2021-05-18
Payer: COMMERCIAL

## 2021-05-18 ENCOUNTER — APPOINTMENT (OUTPATIENT)
Dept: PHYSICAL THERAPY | Age: 56
End: 2021-05-18
Payer: COMMERCIAL

## 2021-05-18 PROCEDURE — 97530 THERAPEUTIC ACTIVITIES: CPT | Performed by: PHYSICAL THERAPIST

## 2021-05-18 PROCEDURE — 97110 THERAPEUTIC EXERCISES: CPT | Performed by: PHYSICAL THERAPIST

## 2021-05-18 NOTE — FLOWSHEET NOTE
stretch  30\" 3    Heel slides  5\" 10x    Prone quad stretch  30\" 3    2\"   Quad sets  5\" 10    SLR - flexion - semireclined 2# 2 10    SLR - abduction  SL clams 4#   Blue 2 10xea    Added 4/30   Leg press #  SL 50# 2 10x Added 4/30  Added SL 5/5   5/11 attempted but increased pain      Quad DL 10# 2 10x Added 5/5   Hamstrings  SL 35# 2 10x Added 5/5   Added 5/5          Therapeutic Activities (22620)       Sit to stand chair 2 10 For improved transitional movements, cueing for equal WB   Step up and over 6\" 1 15 Tactile facilitation initially for appropriate weight shift and knee bend   Lateral stepping blue 2 10    TRX retro lunge Limited range 1 15    Stairs 1 railing 5x 3steps Reciprocal   Step up to knee drive 8\" 1 railing 1 15           Neuromuscular Re-ed (32143)       Tandem stance airex 30\" 2ea                  Manual Intervention (08815)       Knee mobs/PROM       Tib/Fem Mobs       Patella Mobs       Ankle mobs                  Modalities:     Pt. Education:  -all pt questions were answered    Home Exercise Program:  Access Code: [email or web address]  URL: [email or web address]  Date: 04/28/2021  Prepared by: Moe Ramachandran    Exercises  Seated Table Hamstring Stretch - 1 x daily - 7 x weekly - 3 reps - 30s hold  Gastroc Stretch on Step - 1 x daily - 7 x weekly - 3 reps - 30s hold  Sitting Heel Slide with Towel - 1 x daily - 7 x weekly - 10 reps - 5s hold  Quad Set - 1 x daily - 7 x weekly - 10 reps - 5s hold  Supine Straight Leg Raises - 1 x daily - 7 x weekly - 2 sets - 10 reps  Sidelying Hip Abduction - 1 x daily - 7 x weekly - 2 sets - 10 reps      Therapeutic Exercise and NMR EXR  [x] (82952) Provided verbal/tactile cueing for activities related to strengthening, flexibility, endurance, ROM for improvements in LE, proximal hip, and core control with self care, mobility, lifting, ambulation.  [] (28468) Provided verbal/tactile cueing for activities related to improving balance, coordination, normal function with self care, mobility, lifting and ambulation. Modalities:  [x] (91362) Vasopneumatic compression: Utilized vasopneumatic compression to decrease edema / swelling for the purpose of improving mobility and quad tone / recruitment which will allow for increased overall function including but not limited to self-care, transfers, ambulation, and ascending / descending stairs. Charges:  Timed Code Treatment Minutes: 42   Total Treatment Minutes: 42     [] EVAL - LOW (15229)   [] EVAL - MOD (68599)  [] EVAL - HIGH (08389)   [] RE-EVAL (43919)  [x] HV(93848) x 2      [] Ionto  [] NMR (36458) x       [] Vaso  [] Manual (86332) x       [] Ultrasound  [x] TA x 1        [] Mech Traction (84706)  [] Aquatic Therapy x      [] ES (un) (02213):   [] Home Management Training x  [] ES(attended) (63709)   [] Dry Needling 1-2 muscles (38943):  [] Dry Needling 3+ muscles (645806  [] Group:      [] Other:     GOALS:  Patient stated goal: return to playing volleyball  [] Progressing: [] Met: [] Not Met: [] Adjusted    Therapist goals for Patient:   Short Term Goals: To be achieved in: 2 weeks  1. Independent in HEP and progression per patient tolerance, in order to prevent re-injury. [] Progressing: [] Met: [] Not Met: [] Adjusted  2. Patient will have a decrease in pain to facilitate improvement in movement, function, and ADLs as indicated by Functional Deficits. [] Progressing: [] Met: [] Not Met: [] Adjusted    Long Term Goals: To be achieved in: 8-10 weeks  1. Disability index score of 25% or less for the LEFS to assist with reaching prior level of function. [] Progressing: [] Met: [] Not Met: [] Adjusted  2. Patient will demonstrate increased AROM to 0-125 L knee extension/flexion to allow for proper joint functioning as indicated by patients Functional Deficits. [] Progressing: [] Met: [] Not Met: [] Adjusted  3.  Patient will demonstrate an increase in Strength to at least 4+/ throughout without pain as well as good proximal hip strength and control to allow for proper functional mobility as indicated by patients Functional Deficits. [] Progressing: [] Met: [] Not Met: [] Adjusted  4. Patient will return to functional activities including reciprocal stairs without increased symptoms or restriction. [] Progressing: [] Met: [] Not Met: [] Adjusted  5. Patient will return to playing sand volleyball without increased symptoms. [] Progressing: [] Met: [] Not Met: [] Adjusted    Overall Progression Towards Functional goals/ Treatment Progress Update:  [] Patient is progressing as expected towards functional goals listed. [] Progression is slowed due to complexities/Impairments listed. [] Progression has been slowed due to co-morbidities. [x] Plan just implemented, too soon to assess goals progression <30days   [] Goals require adjustment due to lack of progress  [] Patient is not progressing as expected and requires additional follow up with physician  [] Other    Persisting Functional Limitations/Impairments:  []Sitting [x]Standing   [x]Walking [x]Stairs   []Transfers [x]ADLs   [x]Squatting/bending [x]Kneeling  [x]Housework []Job related tasks  []Driving [x]Sports/Recreation   []Sleeping []Other:    ASSESSMENT: Pt. Tolerated therapy today with minimal complaints. Pt. Continues to slowly progress in function. Able to progress resistance with SLR and range with sit to stand. Pt. Continues to have difficulty with step up and over due to limited quad control, especially with unlocking for eccentric descent. Improved self awareness of correct technique with step up but strength deficits limiting control of motion. Able to complete stairs reciprocally with a railing. Pt. Appropriately challenged by increases in resistance activities. Pt. Will continue to benefit from skilled therapy in order to restore functional strength for community navigation.      Treatment/Activity Tolerance:  [x] Pt able to complete treatment [] Patient limited by fatique  [] Patient limited by pain  [] Patient limited by other medical complications  [] Other:     Prognosis:  [x] Good [] Fair  [] Poor    Patient Requires Follow-up: [x] Yes  [] No    Return to Play:    [x]  N/A   []  Stage 1: Intro to Strength   []  Stage 2: Return to Run and Strength   []  Stage 3: Return to Jump and Strength   []  Stage 4: Dynamic Strength and Agility   []  Stage 5: Sport Specific Training     []  Ready to Return to Play, Meets All Above Stages   []  Not Ready for Return to Sports   Comments:            PLAN:  PT 1-2x / week for 8-10 weeks. [x] Continue per plan of care [] Alter current plan (see comments)  [] Plan of care initiated [] Hold pending MD visit [] Discharge    Electronically signed by: Tracey Velarde PT       Note: If patient does not return for scheduled/ recommended follow up visits, this note will serve as a discharge from care along with most recent update on progress.

## 2021-05-21 ENCOUNTER — HOSPITAL ENCOUNTER (OUTPATIENT)
Dept: PHYSICAL THERAPY | Age: 56
Setting detail: THERAPIES SERIES
Discharge: HOME | End: 2021-05-21
Payer: COMMERCIAL

## 2021-05-21 PROCEDURE — 97110 THERAPEUTIC EXERCISES: CPT | Performed by: SPECIALIST/TECHNOLOGIST

## 2021-05-21 PROCEDURE — 97530 THERAPEUTIC ACTIVITIES: CPT | Performed by: SPECIALIST/TECHNOLOGIST

## 2021-05-21 NOTE — FLOWSHEET NOTE
Bailey Christianson  Phone: [phone]   Fax: [phone]    Physical Therapy Treatment Note/ Progress Report:     Date:  2021    Patient Name:  Kaela Sow    :  1965  MRN: [id number]  Restrictions/Precautions:    Medical/Treatment Diagnosis Information:  Diagnosis: M84.362 (ICD-10-CM) - Stress fracture of left tibia with routine healing; S83.232 (ICD-10-CM) - Complex tear of medial meniscus of left knee as current injury  Treatment Diagnosis: L knee pain, weakness, decreased ROM; gait abnormality  Insurance/Certification information:  PT Insurance Information: [facility] Choice+  Physician Information:  Referring Practitioner: Merilyn Boeck, PA-C  Plan of care signed (Y/N): [x]  Yes []  No     Date of Patient follow up with Physician:      Progress Report: []  Yes  [x]  No     Date Range for reporting period:  Beginnin21  Ending:     Progress report due (10 Rx/or 30 days whichever is less): visit #10 or 3/81/[birth year] (date)     Recertification due (POC duration/ or 90 days whichever is less): visit #20 or 21 (date)     Visit # Insurance Allowable Auth required? Date Range   7 [place] []  Yes  [x]  No n/a     Latex Allergy:  [x]NO      []YES  Preferred Language for Healthcare:   [x]English       []other:    Functional Scale:        Date assessed:  LEFS: raw score = 33; dysfunction = 59%   21    Pain level:  0/10     SUBJECTIVE: Pt reports left leg coming along but still not when it needs to be, needs to be stronger with stairs. Has some infrapatellar pain with most steps when walking.    OBJECTIVE:   21 RT knee assists with  6\" FSU compensating   L knee ROM: 0-117 w/ heel slides    RESTRICTIONS/PRECAUTIONS: L knee pmm, repair (calcium phosphate)  of tibial plateau fx  dos: 93    RT knee has crepitice w quad isometrics     Exercises/Interventions:   Therapeutic Exercise (57910)  Resistance / level Sets/sec Reps Notes / Cues   bike L2 5'   upright   IB gastroc stretch  30\" 3x    Seated HS stretch  30\" 3    Heel slides  5\" 10x    Prone quad stretch  30\" 3    2\"   wallsits        SLR - flexion - semireclined 2# 2 10    SLR - abduction  SL clams 4#   Blue   2   15xea    Added 4/30   Leg press #  SL 50# 2 10x Added 4/30  Added SL 5/5   5/11 attempted but increased pain       HOLD 5/21 due to patellar painHamstrings  SL 35# 2 10x Added 5/5   Added 5/5   Isometrics F   10\" 10x    Therapeutic Activities (06936)       Sit to stand left foot back chair 1 15x For improved transitional movements, cueing for equal WB   Step up and over 6\" 1 15 Tactile facilitation initially for appropriate weight shift and knee bend   Lateral stepping blue 2 10    TRX retro lunge   REV slider no hand assist Limited range 1 15x  15x     added 5/21   Reciprocal      CC TKE  5plate 2 65F Added 5/96   Neuromuscular Re-ed (78142)       Tandem stance airex 20\" 3ea                  Manual Intervention (79963)       Knee mobs/PROM       Tib/Fem Mobs       Patella Mobs       Ankle mobs                  Modalities:     Pt. Education:  -all pt questions were answered    Home Exercise Program:  Access Code: [email or web address]  URL: [email or web address]  Date: 04/28/2021  Prepared by: Aurora Da Silva    Exercises  Seated Table Hamstring Stretch - 1 x daily - 7 x weekly - 3 reps - 30s hold  Gastroc Stretch on Step - 1 x daily - 7 x weekly - 3 reps - 30s hold  Sitting Heel Slide with Towel - 1 x daily - 7 x weekly - 10 reps - 5s hold  Quad Set - 1 x daily - 7 x weekly - 10 reps - 5s hold  Supine Straight Leg Raises - 1 x daily - 7 x weekly - 2 sets - 10 reps  Sidelying Hip Abduction - 1 x daily - 7 x weekly - 2 sets - 10 reps      Therapeutic Exercise and NMR EXR  [x] (89135) Provided verbal/tactile cueing for activities related to strengthening, flexibility, endurance, ROM for improvements in LE, proximal hip, and core control with self care, mobility, lifting, ambulation.  [] (02401) Provided verbal/tactile cueing for activities related to improving balance, coordination, kinesthetic sense, posture, motor skill, proprioception  to assist with LE, proximal hip, and core control in self care, mobility, lifting, ambulation and eccentric single leg control.   [] (99706) Therapist is in constant attendance of 2 or more patients providing skilled therapy interventions, but not providing any significant amount of measurable one-on-one time to either patient, for improvements in LE, proximal hip, and core control in self care, mobility, lifting, ambulation and eccentric single leg control.      NMR and Therapeutic Activities:    [] (35512 or 47978) Provided verbal/tactile cueing for activities related to improving balance, coordination, kinesthetic sense, posture, motor skill, proprioception and motor activation to allow for proper function of core, proximal hip and LE with self care and ADLs  [] (68480) Gait Re-education- Provided training and instruction to the patient for proper LE, core and proximal hip recruitment and positioning and eccentric body weight control with ambulation re-education including up and down stairs     Home Exercise Program:    [x] (73640) Reviewed/Progressed HEP activities related to strengthening, flexibility, endurance, ROM of core, proximal hip and LE for functional self-care, mobility, lifting and ambulation/stair navigation   [] (15773)Reviewed/Progressed HEP activities related to improving balance, coordination, kinesthetic sense, posture, motor skill, proprioception of core, proximal hip and LE for self care, mobility, lifting, and ambulation/stair navigation      Manual Treatments:  PROM / STM / Oscillations-Mobs:  G-I, II, III, IV (PA's, Inf., Post.)  [x] (82016) Provided manual therapy to mobilize LE, proximal hip and/or LS spine soft tissue/joints for the purpose of modulating pain, promoting relaxation,  increasing ROM, reducing/eliminating Met: [] Not Met: [] Adjusted  3. Patient will demonstrate an increase in Strength to at least 4+/ throughout without pain as well as good proximal hip strength and control to allow for proper functional mobility as indicated by patients Functional Deficits. [] Progressing: [] Met: [] Not Met: [] Adjusted  4. Patient will return to functional activities including reciprocal stairs without increased symptoms or restriction. [] Progressing: [] Met: [] Not Met: [] Adjusted  5. Patient will return to playing sand volleyball without increased symptoms. [] Progressing: [] Met: [] Not Met: [] Adjusted    Overall Progression Towards Functional goals/ Treatment Progress Update:  [] Patient is progressing as expected towards functional goals listed. [] Progression is slowed due to complexities/Impairments listed. [] Progression has been slowed due to co-morbidities. [x] Plan just implemented, too soon to assess goals progression <30days   [] Goals require adjustment due to lack of progress  [] Patient is not progressing as expected and requires additional follow up with physician  [] Other    Persisting Functional Limitations/Impairments:  []Sitting [x]Standing   [x]Walking [x]Stairs   []Transfers [x]ADLs   [x]Squatting/bending [x]Kneeling  [x]Housework []Job related tasks  []Driving [x]Sports/Recreation   []Sleeping []Other:    ASSESSMENT: Pt. Tolerated therapy today with minimal complaints except for pressure/ crepitice over the Rt knee with wallsits. Pt stair function improved today but will vault off of RT foot versus using her left quadriceps to ascend stairs Pt. Continues to slowly progress in function and tolerated addition of LP Ecc and SL focus and cc TKE. Able to progress resistance with foot positioning  with sit to stand emphasizing left quad power and pushoff from glutes. Able to complete stairs reciprocally with a railing. Pt. Appropriately challenged by increases in resistance activities.  Pt. Will continue to benefit from skilled therapy in order to restore functional strength for community navigation. Treatment/Activity Tolerance:  [x] Pt able to complete treatment [] Patient limited by fatique  [] Patient limited by pain  [] Patient limited by other medical complications  [] Other:     Prognosis:  [x] Good [] Fair  [] Poor    Patient Requires Follow-up: [x] Yes  [] No    Return to Play:    [x]  N/A   []  Stage 1: Intro to Strength   []  Stage 2: Return to Run and Strength   []  Stage 3: Return to Jump and Strength   []  Stage 4: Dynamic Strength and Agility   []  Stage 5: Sport Specific Training     []  Ready to Return to Play, Meets All Above Stages   []  Not Ready for Return to Sports   Comments:            PLAN:  PT 1-2x / week for 8-10 weeks. [x] Continue per plan of care [] Alter current plan (see comments)  [] Plan of care initiated [] Hold pending MD visit [] Discharge    Electronically signed by: Rosalee Gardner, PTA, [id number]      Note: If patient does not return for scheduled/ recommended follow up visits, this note will serve as a discharge from care along with most recent update on progress.

## 2021-05-26 ENCOUNTER — HOSPITAL ENCOUNTER (OUTPATIENT)
Dept: PHYSICAL THERAPY | Age: 56
Setting detail: THERAPIES SERIES
Discharge: HOME | End: 2021-05-26
Payer: COMMERCIAL

## 2021-05-26 PROCEDURE — 97110 THERAPEUTIC EXERCISES: CPT | Performed by: PHYSICAL THERAPIST

## 2021-05-26 PROCEDURE — 97112 NEUROMUSCULAR REEDUCATION: CPT | Performed by: PHYSICAL THERAPIST

## 2021-05-26 PROCEDURE — 97530 THERAPEUTIC ACTIVITIES: CPT | Performed by: PHYSICAL THERAPIST

## 2021-05-26 NOTE — FLOWSHEET NOTE
[facility]  Phone: [phone]   Fax: [phone]    Physical Therapy Treatment Note/ Progress Report:     Date:  2021    Patient Name:  Sera Arredondo    :  1965  MRN: [id number]  Restrictions/Precautions:    Medical/Treatment Diagnosis Information:  Diagnosis: M84.362 (ICD-10-CM) - Stress fracture of left tibia with routine healing; S83.232 (ICD-10-CM) - Complex tear of medial meniscus of left knee as current injury  Treatment Diagnosis: L knee pain, weakness, decreased ROM; gait abnormality  Insurance/Certification information:  PT Insurance Information: [facility] Choice+  Physician Information:  Referring Practitioner: Zuleima Sheppard PA-C  Plan of care signed (Y/N): [x]  Yes []  No     Date of Patient follow up with Physician:      Progress Report: []  Yes  [x]  No     Date Range for reporting period:  Beginnin21  Ending:     Progress report due (10 Rx/or 30 days whichever is less): visit #10 or 21 (date)  NPV    Recertification due (POC duration/ or 90 days whichever is less): visit #20 or 21 (date)     Visit # Insurance Allowable Auth required? Date Range   8 [place] []  Yes  [x]  No n/a     Latex Allergy:  [x]NO      []YES  Preferred Language for Healthcare:   [x]English       []other:    Functional Scale:        Date assessed:  LEFS: raw score = 33; dysfunction = 59%   21    Pain level:  0/10     SUBJECTIVE: pt. Denies pain at this time but notes that she continues to get tired quickly with increased activity such as household chores. Pt. States that she continues to feels some soreness in medial knee throughout the day.      OBJECTIVE:   21 RT knee assists with  6\" FSU compensating   L knee ROM: 0-117 w/ heel slides    RESTRICTIONS/PRECAUTIONS: L knee pmm, repair (calcium phosphate)  of tibial plateau fx  dos: 80    RT knee has crepitice w quad isometrics /    Exercises/Interventions: Therapeutic Exercise (71121)  Resistance / level Sets/sec Reps Notes / Cues   bike L2 5'   upright   IB gastroc stretch  30\" 3x    Seated HS stretch  30\" 3    Heel slides  5\" 10x    Prone quad stretch  30\" 3    2\"   wallsits        SLR - flexion - semireclined 2# 2 10    SLR - abduction  SL clams 4#   Blue   2   15xea    Added 4/30   Leg press #  SL 50# 2 10x Added 4/30  Added SL 5/5   5/11 attempted but increased pain      Quad 90/30 DL 10# 2 10x  eccentric emphasisHamstrings  SL 35# 2 10x Added 5/5   Added 5/5                    Therapeutic Activities (99788)       Sit to stand left foot back chair 1 15x For improved transitional movements, cueing for equal WB   Step up and over 6\" 2 10 Verbal cueing initially for appropriate weight shift and knee bend   Lateral stepping blue 2 10    TRX retro lunge   REV slider Limited range 1 15x  15x     cueing for alignment   Stairs Reciprocal   Step up to knee drive    Added 2/13   Neuromuscular Re-ed (59073)          biodex balance RC L10 3'     LSD 2\" 2 10 Tactile cueing for \"unlocking\"          Manual Intervention (94023)       Knee mobs/PROM       Tib/Fem Mobs       Patella Mobs       Ankle mobs                  Modalities:     Pt. Education:  -all pt questions were answered    Home Exercise Program:  Access Code: [email or web address]  URL: [email or web address]  Date: 04/28/2021  Prepared by: Rebecca Care    Exercises  Seated Table Hamstring Stretch - 1 x daily - 7 x weekly - 3 reps - 30s hold  Gastroc Stretch on Step - 1 x daily - 7 x weekly - 3 reps - 30s hold  Sitting Heel Slide with Towel - 1 x daily - 7 x weekly - 10 reps - 5s hold  Quad Set - 1 x daily - 7 x weekly - 10 reps - 5s hold  Supine Straight Leg Raises - 1 x daily - 7 x weekly - 2 sets - 10 reps  Sidelying Hip Abduction - 1 x daily - 7 x weekly - 2 sets - 10 reps      Therapeutic Exercise and NMR EXR  [x] (72474) Provided verbal/tactile cueing for activities related to strengthening, flexibility, endurance, ROM for improvements in LE, proximal hip, and core control with self care, mobility, lifting, ambulation.  [] (65585) Provided verbal/tactile cueing for activities related to improving balance, coordination, kinesthetic sense, posture, motor skill, proprioception  to assist with LE, proximal hip, and core control in self care, mobility, lifting, ambulation and eccentric single leg control.   [] (03268) Therapist is in constant attendance of 2 or more patients providing skilled therapy interventions, but not providing any significant amount of measurable one-on-one time to either patient, for improvements in LE, proximal hip, and core control in self care, mobility, lifting, ambulation and eccentric single leg control.      NMR and Therapeutic Activities:    [x] (52058 or 43139) Provided verbal/tactile cueing for activities related to improving balance, coordination, kinesthetic sense, posture, motor skill, proprioception and motor activation to allow for proper function of core, proximal hip and LE with self care and ADLs  [] (37915) Gait Re-education- Provided training and instruction to the patient for proper LE, core and proximal hip recruitment and positioning and eccentric body weight control with ambulation re-education including up and down stairs     Home Exercise Program:    [x] (05734) Reviewed/Progressed HEP activities related to strengthening, flexibility, endurance, ROM of core, proximal hip and LE for functional self-care, mobility, lifting and ambulation/stair navigation   [] (22109)Reviewed/Progressed HEP activities related to improving balance, coordination, kinesthetic sense, posture, motor skill, proprioception of core, proximal hip and LE for self care, mobility, lifting, and ambulation/stair navigation      Manual Treatments:  PROM / STM / Oscillations-Mobs:  G-I, II, III, IV (PA's, Inf., Post.)  [x] (48195) Provided manual therapy to mobilize LE, proximal hip and/or LS spine soft tissue/joints for the purpose of modulating pain, promoting relaxation,  increasing ROM, reducing/eliminating soft tissue swelling/inflammation/restriction, improving soft tissue extensibility and allowing for proper ROM for normal function with self care, mobility, lifting and ambulation. Modalities:  [x] (83417) Vasopneumatic compression: Utilized vasopneumatic compression to decrease edema / swelling for the purpose of improving mobility and quad tone / recruitment which will allow for increased overall function including but not limited to self-care, transfers, ambulation, and ascending / descending stairs. Charges:  Timed Code Treatment Minutes: 44   Total Treatment Minutes: 44     [] EVAL - LOW (88429)   [] EVAL - MOD (53573)  [] EVAL - HIGH (59299)   [] RE-EVAL (28123)  [x] AC(46997) x 1      [] Ionto  [x] NMR (47256) x  1     [] Vaso  [] Manual (45324) x       [] Ultrasound  [x] TA x 1        [] Mech Traction (47734)  [] Aquatic Therapy x      [] ES (un) (61866):   [] Home Management Training x  [] ES(attended) (75160)   [] Dry Needling 1-2 muscles (75921):  [] Dry Needling 3+ muscles (320417  [] Group:      [] Other:     GOALS:  Patient stated goal: return to playing volleyball  [] Progressing: [] Met: [] Not Met: [] Adjusted    Therapist goals for Patient:   Short Term Goals: To be achieved in: 2 weeks  1. Independent in HEP and progression per patient tolerance, in order to prevent re-injury. [] Progressing: [] Met: [] Not Met: [] Adjusted  2. Patient will have a decrease in pain to facilitate improvement in movement, function, and ADLs as indicated by Functional Deficits. [] Progressing: [] Met: [] Not Met: [] Adjusted    Long Term Goals: To be achieved in: 8-10 weeks  1. Disability index score of 25% or less for the LEFS to assist with reaching prior level of function. [] Progressing: [] Met: [] Not Met: [] Adjusted  2.  Patient will demonstrate increased AROM to 0-125 L knee extension/flexion to allow for proper joint functioning as indicated by patients Functional Deficits. [] Progressing: [] Met: [] Not Met: [] Adjusted  3. Patient will demonstrate an increase in Strength to at least 4+/ throughout without pain as well as good proximal hip strength and control to allow for proper functional mobility as indicated by patients Functional Deficits. [] Progressing: [] Met: [] Not Met: [] Adjusted  4. Patient will return to functional activities including reciprocal stairs without increased symptoms or restriction. [] Progressing: [] Met: [] Not Met: [] Adjusted  5. Patient will return to playing sand volleyball without increased symptoms. [] Progressing: [] Met: [] Not Met: [] Adjusted    Overall Progression Towards Functional goals/ Treatment Progress Update:  [] Patient is progressing as expected towards functional goals listed. [] Progression is slowed due to complexities/Impairments listed. [] Progression has been slowed due to co-morbidities. [x] Plan just implemented, too soon to assess goals progression <30days   [] Goals require adjustment due to lack of progress  [] Patient is not progressing as expected and requires additional follow up with physician  [] Other    Persisting Functional Limitations/Impairments:  []Sitting [x]Standing   [x]Walking [x]Stairs   []Transfers [x]ADLs   [x]Squatting/bending [x]Kneeling  [x]Housework []Job related tasks  []Driving [x]Sports/Recreation   []Sleeping []Other:    ASSESSMENT: Pt. Tolerated therapy today with minimal complaints. Pt. Continues to have some patellar pain. Difficulty with \"unlocking\" and eccentric control of quad. With leg extensions emphasis on eccentric lowering. Tactile cueing with closed chain activities to improve knee alignment and mechanics. Pt. Fatigued quickly with LSD and required UE support.  Pt. Will continue to benefit from skilled therapy in order to improved quad strength and control for normalized stairs. Treatment/Activity Tolerance:  [x] Pt able to complete treatment [] Patient limited by fatique  [] Patient limited by pain  [] Patient limited by other medical complications  [] Other:     Prognosis:  [x] Good [] Fair  [] Poor    Patient Requires Follow-up: [x] Yes  [] No    Return to Play:    [x]  N/A   []  Stage 1: Intro to Strength   []  Stage 2: Return to Run and Strength   []  Stage 3: Return to Jump and Strength   []  Stage 4: Dynamic Strength and Agility   []  Stage 5: Sport Specific Training     []  Ready to Return to Play, Meets All Above Stages   []  Not Ready for Return to Sports   Comments:            PLAN:  PT 1-2x / week for 8-10 weeks. [x] Continue per plan of care [] Alter current plan (see comments)  [] Plan of care initiated [] Hold pending MD visit [] Discharge    Electronically signed by: Sheila Penny PT      Note: If patient does not return for scheduled/ recommended follow up visits, this note will serve as a discharge from care along with most recent update on progress.

## 2021-05-28 ENCOUNTER — APPOINTMENT (OUTPATIENT)
Dept: PHYSICAL THERAPY | Age: 56
End: 2021-05-28
Payer: COMMERCIAL

## 2021-06-01 ENCOUNTER — TELEPHONE (OUTPATIENT)
Dept: FAMILY MEDICINE CLINIC | Age: 56
End: 2021-06-01

## 2021-06-01 ENCOUNTER — HOSPITAL ENCOUNTER (OUTPATIENT)
Dept: PHYSICAL THERAPY | Age: 56
Setting detail: THERAPIES SERIES
Discharge: HOME | End: 2021-06-01
Payer: COMMERCIAL

## 2021-06-01 RX ORDER — LISINOPRIL 20 MG/1
20 TABLET ORAL DAILY
Qty: 90 TABLET | Refills: 1 | Status: SHIPPED | OUTPATIENT
Start: 2021-06-01 | End: 2021-11-16 | Stop reason: SDUPTHER

## 2021-06-01 NOTE — TELEPHONE ENCOUNTER
That is the request it was sent   We we increase it to 20 mg that is what she should take   New rx for 20 mg sent to her pharmacy

## 2021-06-01 NOTE — FLOWSHEET NOTE
[address] [facility]    Physical Therapy  Cancellation/No-show Note  Patient Name:  Jerry Browne  :  1965   Date:  2021    Cancelled visits to date: 1  No-shows to date: 0    For today's appointment patient:  [x]  Cancelled -   []  Rescheduled appointment  []  No-show     Reason given by patient:  []  Patient ill  []  Conflicting appointment  []  No transportation    []  Conflict with work  [x]  No reason given  []  Other:     Comments:      Phone call information:   []  Phone call made today to patient at _ time at number provided:      []  Patient answered, conversation as follows:    []  Patient did not answer, message left as follows:  []  Phone call not made today  [x]  Phone call not needed - pt contacted us to cancel and provided reason for cancellation.      Electronically signed by:  Janeth Dillard, PT, PT

## 2021-06-01 NOTE — TELEPHONE ENCOUNTER
On OV 3-19-21, the Lisinopril was 20 mg per day.   The 10 mg was called into the pharmacy today, please advise

## 2021-06-04 ENCOUNTER — OFFICE VISIT (OUTPATIENT)
Dept: ORTHOPEDIC SURGERY | Age: 56
End: 2021-06-04

## 2021-06-04 VITALS — HEIGHT: 67 IN | WEIGHT: 219 LBS | BODY MASS INDEX: 34.37 KG/M2

## 2021-06-04 DIAGNOSIS — S83.232D COMPLEX TEAR OF MEDIAL MENISCUS OF LEFT KNEE AS CURRENT INJURY, SUBSEQUENT ENCOUNTER: ICD-10-CM

## 2021-06-04 DIAGNOSIS — M84.362D STRESS FRACTURE OF LEFT TIBIA WITH ROUTINE HEALING, SUBSEQUENT ENCOUNTER: ICD-10-CM

## 2021-06-04 PROCEDURE — 99024 POSTOP FOLLOW-UP VISIT: CPT | Performed by: ORTHOPAEDIC SURGERY

## 2021-06-04 RX ORDER — IBUPROFEN 800 MG/1
800 TABLET ORAL EVERY 8 HOURS
Qty: 90 TABLET | Refills: 1 | Status: SHIPPED | OUTPATIENT
Start: 2021-06-04 | End: 2021-07-04

## 2021-06-04 NOTE — PROGRESS NOTES
Dottie Records guerline Esteves. MD [place]  [address]  [address]  [address] [facility]  Liyah Penn  [phone]  Encounter Date: 6/4/2021  YOB: 1965    Chief Complaint   Patient presents with    Post-Op Check     LEFT KNEE ARTHROSCOPY PARTIAL MEDIAL MENISCECTOMY, REPAIR OF TIBIAL PLATEAU FRACTURE; DOS 4/1/21. History:Ms. Samantha Beck is here for 2 month follow up regarding her left knee diagnostic video arthroscopy with partial medial meniscectomy and calcium phosphate injection of tibial plateau fracture. Overall she feels that her knee is functioning well. She still has pain that reaches up to 3/10 when she is on it for several hours at a time. Compared to her symptoms preoperatively she feels like she is made significant improvement in her functional activities though. She still has not returned to playing sand volleyball and recognizes that with her underlying arthritis that might not be realistic. No recent falls or direct trauma. Exam:  Ht 5' 7\" (1.702 m)   Wt 219 lb (99.3 kg)   BMI 34.30 kg/m²   General: Alert and oriented x3, No acute distress, Cooperative and conversant. Obesity Present  Mood and affect are appropriate. Left Knee : Incision areas remain well-healed. No significant joint effusion. She has full active extension with flexion comfortable beyond 120 degrees in the seated position. Gross motor function light touch sensation intact throughout the left lower extremity. Gait: Tandem gait ambulating throughout the office today.       Assessment:   Diagnosis Orders   1. 4/1/21 LEFT knee partial medial meniscectomy  ibuprofen (ADVIL;MOTRIN) 800 MG tablet   2. 4/1/21 LEFT knee tibial plateau stress fracture with calcium phosphate injection  ibuprofen (ADVIL;MOTRIN) 800 MG tablet     Plan:  She is doing well following her arthroscopy and calcium phosphate injection into the area of tibial plateau stress. Advised her to continue with her activities as she tolerates. She has been using ibuprofen on an as-needed basis and can continue to do so when necessary. She requested a refill of the 800 mg tablets which was provided to her today. From my perspective she can follow back with me on an as-needed basis. She understands and accepts this course of care. By signing my name below, Joao Heriberto, attest that this documentation has been prepared under the direction and in the presence of Zully Pickard MD.   Electronically Signed: Horace Kee, 6/4/21, 12:29 PM EDT. Dana Gerber MD, personally performed the services described in this documentation. All medical record entries made by the clayibdaniella were at my direction and in my presence. I have reviewed the chart and discharge instructions (if applicable) and agree that the record reflects my personal performance and is accurate and complete. Zully Pickard MD, 6/13/21, 6:53 PM EDT. Documentation was done using voice recognition dragon software. Every effort was made to ensure accuracy; however, inadvertent  Unintentional computerized transcription errors may be present.

## 2021-11-16 ENCOUNTER — PATIENT MESSAGE (OUTPATIENT)
Dept: FAMILY MEDICINE CLINIC | Age: 56
End: 2021-11-16

## 2021-11-16 DIAGNOSIS — I10 BENIGN ESSENTIAL HTN: ICD-10-CM

## 2021-11-16 RX ORDER — LISINOPRIL 20 MG/1
20 TABLET ORAL DAILY
Qty: 90 TABLET | Refills: 1 | Status: SHIPPED | OUTPATIENT
Start: 2021-11-16

## 2021-11-16 RX ORDER — METOPROLOL SUCCINATE 100 MG/1
TABLET, EXTENDED RELEASE ORAL
Qty: 90 TABLET | Refills: 2 | Status: SHIPPED | OUTPATIENT
Start: 2021-11-16

## 2021-11-16 NOTE — TELEPHONE ENCOUNTER
Medication:   Requested Prescriptions     Pending Prescriptions Disp Refills    lisinopril (PRINIVIL;ZESTRIL) 20 MG tablet 90 tablet 1     Sig: Take 1 tablet by mouth daily    metoprolol succinate (TOPROL XL) 100 MG extended release tablet 90 tablet 2     Sig: Take 1 tablet by mouth once daily            Patient Phone Number: [phone] (home)     Last appt: 3/19/2021

## 2021-11-16 NOTE — TELEPHONE ENCOUNTER
From: Jose Rosas  To: Dr. Shashi Mayers: 11/16/2021 12:09 PM EST  Subject: Prescription Question    I sent a request for refill for my BP meds. [place] I changed the pharmacy to Mail order. The My Chart didnt give me an option to select mail to me instead of  at pharmacy. Please specify I need it mailed to me when you send it to the [address]  Thanks for your help!!

## 2021-11-21 ENCOUNTER — E-VISIT (OUTPATIENT)
Dept: PRIMARY CARE CLINIC | Age: 56
End: 2021-11-21
Payer: COMMERCIAL

## 2021-11-21 DIAGNOSIS — H92.09 OTALGIA, UNSPECIFIED LATERALITY: Primary | ICD-10-CM

## 2021-11-21 PROCEDURE — 99421 OL DIG E/M SVC 5-10 MIN: CPT | Performed by: NURSE PRACTITIONER

## 2021-11-21 ASSESSMENT — LIFESTYLE VARIABLES
SMOKING_STATUS: NO, I'M A FORMER SMOKER
SMOKING_YEARS: 20
PACKS_PER_DAY: 1

## 2021-11-21 NOTE — PROGRESS NOTES
Reviewed questionnaire  Reviewed med, allergies and history    Dx cerumen impaction   Otalgia     Plan  -rx for zpack and steroids  Pt sent a message stating she wants antibiotics d/t pressure in ears. Will send in antibiotics and steroids as I think it is more fluid related than infection but will treat both. I recommend f/u with your pcp this week to evaluate your ear. I have attached some instructions on other things you can do at home to possibly help with your ear.      Time spent 11-20

## 2021-11-21 NOTE — PATIENT INSTRUCTIONS
Patient Education        Earwax Blockage: Care Instructions  Your Care Instructions     Earwax is a natural substance that protects the ear canal. Normally, earwax drains from the ears and does not cause problems. Sometimes earwax builds up and hardens. Earwax blockage (also called cerumen impaction) can cause some loss of hearing and pain. When wax is tightly packed, you will need to have your doctor remove it. Follow-up care is a key part of your treatment and safety. Be sure to make and go to all appointments, and call your doctor if you are having problems. It's also a good idea to know your test results and keep a list of the medicines you take. How can you care for yourself at home? · Do not try to remove earwax with cotton swabs, fingers, or other objects. This can make the blockage worse and damage the eardrum. · If your doctor recommends that you try to remove earwax at home:  ? Soften and loosen the earwax with warm mineral oil. You also can try hydrogen peroxide mixed with an equal amount of room temperature water. Place 2 drops of the fluid, warmed to body temperature, in the ear two times a day for up to 5 days. ? Once the wax is loose and soft, all that is usually needed to remove it from the ear canal is a gentle, warm shower. Direct the water into the ear, then tip your head to let the earwax drain out. Dry your ear thoroughly with a hair dryer set on low. Hold the dryer several inches from your ear. ? If the warm mineral oil and shower do not work, use an over-the-counter wax softener. Read and follow all instructions on the label. After using the wax softener, use an ear syringe to gently flush the ear. Make sure the flushing solution is body temperature. Cool or hot fluids in the ear can cause dizziness. When should you call for help?    Call your doctor now or seek immediate medical care if:    · Pus or blood drains from your ear.     · Your ears are ringing or feel full.     · You have a loss of hearing. Watch closely for changes in your health, and be sure to contact your doctor if:    · You have pain or reduced hearing after 1 week of home treatment.     · You have any new symptoms, such as nausea or balance problems. Where can you learn more? Go to [email or web address] and sign in to your [facility] account. Enter W283 in the [facility] box to learn more about \"Earwax Blockage: Care Instructions. \"     If you do not have an account, please click on the \"Sign Up Now\" link. Current as of: July 1, 2021               Content Version: 13.0  © 0641-9042 [employer]. Care instructions adapted under license by [employer]. If you have questions about a medical condition or this instruction, always ask your healthcare professional. [address] any warranty or liability for your use of this information.

## 2021-11-22 RX ORDER — METHYLPREDNISOLONE 4 MG/1
TABLET ORAL
Qty: 1 KIT | Refills: 0 | Status: SHIPPED | OUTPATIENT
Start: 2021-11-22 | End: 2021-11-28

## 2021-11-22 RX ORDER — AZITHROMYCIN 250 MG/1
250 TABLET, FILM COATED ORAL SEE ADMIN INSTRUCTIONS
Qty: 6 TABLET | Refills: 0 | Status: SHIPPED | OUTPATIENT
Start: 2021-11-22 | End: 2021-11-27

## 2022-08-23 ENCOUNTER — E-VISIT (OUTPATIENT)
Dept: FAMILY MEDICINE CLINIC | Age: 57
End: 2022-08-23
Payer: COMMERCIAL

## 2022-08-23 DIAGNOSIS — R05.9 COUGH: Primary | ICD-10-CM

## 2022-08-23 PROCEDURE — 99422 OL DIG E/M SVC 11-20 MIN: CPT | Performed by: FAMILY MEDICINE

## 2022-08-23 ASSESSMENT — LIFESTYLE VARIABLES: SMOKING_STATUS: NO, I HAVE NEVER SMOKED

## 2022-08-24 RX ORDER — AZITHROMYCIN 250 MG/1
250 TABLET, FILM COATED ORAL SEE ADMIN INSTRUCTIONS
Qty: 6 TABLET | Refills: 0 | Status: SHIPPED | OUTPATIENT
Start: 2022-08-24 | End: 2022-08-29

## 2022-08-24 NOTE — PROGRESS NOTES
HPI: as per patient provided history  Exam: N/A (electronic visit)  ASSESSMENT/PLAN:  1. Cough    Possible AR, use flonase and mucinex as recommended  Prescription for antibiotic given but patient instructed not to start this medication unless worsening symptoms, patient agrees and verbalizes understanding     - azithromycin (ZITHROMAX) 250 MG tablet; Take 1 tablet by mouth See Admin Instructions for 5 days 500mg on day 1 followed by 250mg on days 2 - 5  Dispense: 6 tablet; Refill: 0      Patient instructed to call the office if worsens, or fails to improve as anticipated. 11-20 minutes were spent on the digital evaluation and management of this patient.

## 2022-10-19 NOTE — TELEPHONE ENCOUNTER
Medication:   Requested Prescriptions     Pending Prescriptions Disp Refills    triamcinolone (KENALOG) 0.1 % ointment 80 g 0     Sig: Apply twice a day to affected area        Last Filled:      Patient Phone Number: [phone] (home) [phone] (work)    Last appt: 8/23/2022   Next appt: Visit date not found    Last OARRS: No flowsheet data found.

## 2022-11-23 DIAGNOSIS — S83.232D COMPLEX TEAR OF MEDIAL MENISCUS OF LEFT KNEE AS CURRENT INJURY, SUBSEQUENT ENCOUNTER: ICD-10-CM

## 2022-11-23 DIAGNOSIS — M84.362D STRESS FRACTURE OF LEFT TIBIA WITH ROUTINE HEALING, SUBSEQUENT ENCOUNTER: ICD-10-CM

## 2022-11-23 RX ORDER — IBUPROFEN 800 MG/1
800 TABLET ORAL EVERY 8 HOURS
Qty: 90 TABLET | Refills: 1 | Status: SHIPPED | OUTPATIENT
Start: 2022-11-23 | End: 2022-12-23

## (undated) DEVICE — GOWN,AURORA,NONREINF,RAGLAN,XXL,STERILE: Brand: MEDLINE

## (undated) DEVICE — GLOVE ORANGE PI 8 1/2   MSG9085

## (undated) DEVICE — SUTURE MCRYL SZ 3-0 L18IN ABSRB UD L19MM PS-2 3/8 CIR PRIM Y497G

## (undated) DEVICE — COVER,MAYO STAND,STERILE: Brand: MEDLINE

## (undated) DEVICE — STRIP,CLOSURE,WOUND,MEDI-STRIP,1/2X4: Brand: MEDLINE

## (undated) DEVICE — HYPODERMIC SAFETY NEEDLE: Brand: MAGELLAN

## (undated) DEVICE — 3.5 MM FULL RADIUS ELITE STRAIGHT                                    DISPOSABLE BLADES, BEIGE,PACKAGED 6                                    PER BOX, STERILE

## (undated) DEVICE — MAT FLR ABS DISP

## (undated) DEVICE — SET IRRIG L94IN ID0.281IN W/ 4.5IN DST FLX CONN 2 LD ON OFF

## (undated) DEVICE — Z INACTIVE USE 2660664 SOLUTION IRRIG 3000ML 0.9% SOD CHL USP UROMATIC PLAS CONT

## (undated) DEVICE — MEDI-VAC NON-CONDUCTIVE SUCTION TUBING: Brand: CARDINAL HEALTH

## (undated) DEVICE — DRAPE C ARM UNIV W41XL74IN CLR PLAS XR VELC CLSR POLY STRP

## (undated) DEVICE — Device

## (undated) DEVICE — CHLORAPREP 26ML ORANGE

## (undated) DEVICE — ZIMMER® STERILE DISPOSABLE TOURNIQUET CUFF WITH PLC, DUAL PORT, SINGLE BLADDER, 34 IN. (86 CM)